# Patient Record
Sex: FEMALE | Race: WHITE | NOT HISPANIC OR LATINO | Employment: FULL TIME | URBAN - METROPOLITAN AREA
[De-identification: names, ages, dates, MRNs, and addresses within clinical notes are randomized per-mention and may not be internally consistent; named-entity substitution may affect disease eponyms.]

---

## 2018-05-29 ENCOUNTER — OFFICE VISIT (OUTPATIENT)
Dept: FAMILY MEDICINE CLINIC | Facility: CLINIC | Age: 38
End: 2018-05-29

## 2018-05-29 VITALS
TEMPERATURE: 97.7 F | BODY MASS INDEX: 21.68 KG/M2 | WEIGHT: 127 LBS | DIASTOLIC BLOOD PRESSURE: 54 MMHG | SYSTOLIC BLOOD PRESSURE: 126 MMHG | HEART RATE: 48 BPM | HEIGHT: 64 IN | OXYGEN SATURATION: 99 %

## 2018-05-29 DIAGNOSIS — F33.0 MILD EPISODE OF RECURRENT MAJOR DEPRESSIVE DISORDER (HCC): ICD-10-CM

## 2018-05-29 DIAGNOSIS — F41.9 ANXIETY: Primary | ICD-10-CM

## 2018-05-29 PROCEDURE — 99203 OFFICE O/P NEW LOW 30 MIN: CPT | Performed by: NURSE PRACTITIONER

## 2018-05-29 RX ORDER — CITALOPRAM 20 MG/1
20 TABLET ORAL DAILY
Qty: 90 TABLET | Refills: 3 | Status: SHIPPED | OUTPATIENT
Start: 2018-05-29 | End: 2018-08-06 | Stop reason: DRUGHIGH

## 2018-05-29 RX ORDER — LEVOCETIRIZINE DIHYDROCHLORIDE 5 MG/1
5 TABLET, FILM COATED ORAL EVERY EVENING
COMMUNITY
End: 2020-02-13 | Stop reason: SDUPTHER

## 2018-05-29 NOTE — PROGRESS NOTES
"Subjective   Valeria Castillo is a 38 y.o. female.     History of Present Illness   Valeria Castillo 38 y.o. female who presents today for a new patient appointment.    she has a history of   Patient Active Problem List   Diagnosis   • Anxiety   • Mild episode of recurrent major depressive disorder   .  she is here to establish care I reviewed the PFSH recorded today by my MA/LPN staff.   she   She has been feeling well.    She is presenting to the office today seeking help regarding anxiety and mild depression that she's been experiencing for at least 2 years.  Several years ago she tried Wellbutrin but she didn't like the way that made her feel.  She also has been on Ativan in the past and currently has a small amount that left for her anxiety.  Patient states that she has irritability road rage she also snaps at people in periods of anger.    The following portions of the patient's history were reviewed and updated as appropriate: allergies, current medications, past social history and problem list.    Review of Systems   Constitutional: Negative for fever.   All other systems reviewed and are negative.      Objective   /54 (BP Location: Left arm, Patient Position: Sitting)   Pulse (!) 48   Temp 97.7 °F (36.5 °C)   Ht 162.6 cm (64\")   Wt 57.6 kg (127 lb)   SpO2 99%   BMI 21.80 kg/m²   Physical Exam   Constitutional: She is oriented to person, place, and time. Vital signs are normal. She appears well-developed and well-nourished. No distress.   HENT:   Head: Normocephalic.   Cardiovascular: Normal rate, regular rhythm and normal heart sounds.    Pulmonary/Chest: Effort normal and breath sounds normal.   Neurological: She is alert and oriented to person, place, and time. Gait normal.   Psychiatric: She has a normal mood and affect. Her behavior is normal. Judgment and thought content normal.   Vitals reviewed.      Assessment/Plan   Problem List Items Addressed This Visit        Other    Anxiety - Primary    " Relevant Medications    citalopram (CELEXA) 20 MG tablet    Mild episode of recurrent major depressive disorder    Relevant Medications    citalopram (CELEXA) 20 MG tablet        rto in 6 weeks

## 2018-06-14 ENCOUNTER — TELEPHONE (OUTPATIENT)
Dept: FAMILY MEDICINE CLINIC | Facility: CLINIC | Age: 38
End: 2018-06-14

## 2018-06-14 NOTE — TELEPHONE ENCOUNTER
Pt states she has been taking the celexa for a couple weeks and she can notice a difference but she still feels like she's on edge and the irritability is still there. Pt likes the medication and does not want to change but is curious if she might need an increase in dosage. Please advise.

## 2018-07-10 ENCOUNTER — OFFICE VISIT (OUTPATIENT)
Dept: FAMILY MEDICINE CLINIC | Facility: CLINIC | Age: 38
End: 2018-07-10

## 2018-07-10 ENCOUNTER — TELEPHONE (OUTPATIENT)
Dept: FAMILY MEDICINE CLINIC | Facility: CLINIC | Age: 38
End: 2018-07-10

## 2018-07-10 VITALS
TEMPERATURE: 98.3 F | HEART RATE: 63 BPM | BODY MASS INDEX: 21.41 KG/M2 | SYSTOLIC BLOOD PRESSURE: 104 MMHG | DIASTOLIC BLOOD PRESSURE: 68 MMHG | OXYGEN SATURATION: 99 % | HEIGHT: 64 IN | WEIGHT: 125.4 LBS

## 2018-07-10 DIAGNOSIS — F33.0 MILD EPISODE OF RECURRENT MAJOR DEPRESSIVE DISORDER (HCC): ICD-10-CM

## 2018-07-10 DIAGNOSIS — F41.9 ANXIETY: Primary | ICD-10-CM

## 2018-07-10 PROCEDURE — 99213 OFFICE O/P EST LOW 20 MIN: CPT | Performed by: NURSE PRACTITIONER

## 2018-07-10 NOTE — TELEPHONE ENCOUNTER
Pt forgot to request a sleep aide in her visit today. Pt use to see Dr. Derrick Putnam at [facility]. Dr. Putnam prescribed her mediation for insomnia. Pt states that you can contact his office at [phone] if you have any questions. Is this something Leonides will prescribe for pt?

## 2018-07-10 NOTE — PROGRESS NOTES
"Subjective   Valeria Castillo is a 38 y.o. female.     History of Present Illness   Patient presenting to the office today to follow-up on adding Celexa 20 mg daily.  Patient taking it daily as directed without any side effects she states that she is seeing some improvement but she's not completely where she would like to be.  She still having moments of irritability.    The following portions of the patient's history were reviewed and updated as appropriate: allergies, current medications, past social history and problem list.    Review of Systems   Constitutional: Negative for fever.   All other systems reviewed and are negative.      Objective   /68 (BP Location: Right arm, Patient Position: Sitting)   Pulse 63   Temp 98.3 °F (36.8 °C)   Ht 162.6 cm (64.02\")   Wt 56.9 kg (125 lb 6.4 oz)   SpO2 99%   BMI 21.51 kg/m²   Physical Exam   Constitutional: She is oriented to person, place, and time. Vital signs are normal. She appears well-developed and well-nourished. No distress.   HENT:   Head: Normocephalic.   Cardiovascular: Normal rate, regular rhythm and normal heart sounds.    Pulmonary/Chest: Effort normal and breath sounds normal.   Neurological: She is alert and oriented to person, place, and time. Gait normal.   Psychiatric: She has a normal mood and affect. Her behavior is normal. Judgment and thought content normal.   Vitals reviewed.      Assessment/Plan   Problem List Items Addressed This Visit        Other    Anxiety - Primary    Mild episode of recurrent major depressive disorder (CMS/HCC)        Increase celexa to 30mg and call in 4 weeks if not better        "

## 2018-07-11 RX ORDER — DOXEPIN HYDROCHLORIDE 10 MG/1
10 CAPSULE ORAL NIGHTLY
Qty: 30 CAPSULE | Refills: 0 | Status: SHIPPED | OUTPATIENT
Start: 2018-07-11 | End: 2018-11-20

## 2018-07-11 NOTE — TELEPHONE ENCOUNTER
temazepam is the one she has used with success. She also having night sweats that she mentioned in her appointment.

## 2018-07-11 NOTE — TELEPHONE ENCOUNTER
Left message letting patient know what gracia said and for her to call the office back if she is ok with gracia prescribing something else

## 2018-07-20 NOTE — TELEPHONE ENCOUNTER
Patient left message asking if gracia would call her she would like to speak with her directly. It is regarding her sleep medication

## 2018-07-30 RX ORDER — ZOLPIDEM TARTRATE 10 MG/1
10 TABLET ORAL NIGHTLY PRN
Qty: 90 TABLET | Refills: 0 | OUTPATIENT
Start: 2018-07-30 | End: 2018-11-06 | Stop reason: SDUPTHER

## 2018-08-06 ENCOUNTER — TELEPHONE (OUTPATIENT)
Dept: FAMILY MEDICINE CLINIC | Facility: CLINIC | Age: 38
End: 2018-08-06

## 2018-08-06 RX ORDER — CITALOPRAM 40 MG/1
40 TABLET ORAL DAILY
Qty: 30 TABLET | Refills: 3 | Status: SHIPPED | OUTPATIENT
Start: 2018-08-06 | End: 2019-01-20 | Stop reason: SDUPTHER

## 2018-08-06 NOTE — TELEPHONE ENCOUNTER
Called patient and she said gracia told her to call if the dose needed to be increased again. She was seen on 7/10 and gracia put at the end of note that patient was being increased to 30mg and to call in 4 weeks if not better

## 2018-11-06 RX ORDER — ZOLPIDEM TARTRATE 10 MG/1
10 TABLET ORAL NIGHTLY PRN
Qty: 90 TABLET | Refills: 0 | Status: SHIPPED | OUTPATIENT
Start: 2018-11-06 | End: 2018-11-20 | Stop reason: ALTCHOICE

## 2018-11-06 NOTE — TELEPHONE ENCOUNTER
Pt made an Ambien appointment on 11/20. That is pt's next day off from work. Pt states that she did not know she had to be seen every 3 mths for the Ambien? Pt requesting a refill until she can come in for appointment?     Pt does have appointment with [facility] in [place] but pt states they keep rescheduling her appt.

## 2018-11-20 ENCOUNTER — OFFICE VISIT (OUTPATIENT)
Dept: FAMILY MEDICINE CLINIC | Facility: CLINIC | Age: 38
End: 2018-11-20

## 2018-11-20 VITALS
BODY MASS INDEX: 27.76 KG/M2 | WEIGHT: 162.6 LBS | HEART RATE: 82 BPM | TEMPERATURE: 98 F | SYSTOLIC BLOOD PRESSURE: 128 MMHG | HEIGHT: 64 IN | DIASTOLIC BLOOD PRESSURE: 74 MMHG | OXYGEN SATURATION: 98 % | RESPIRATION RATE: 14 BRPM

## 2018-11-20 DIAGNOSIS — F51.04 CHRONIC INSOMNIA: ICD-10-CM

## 2018-11-20 DIAGNOSIS — F33.0 MILD EPISODE OF RECURRENT MAJOR DEPRESSIVE DISORDER (HCC): Primary | ICD-10-CM

## 2018-11-20 PROCEDURE — 99214 OFFICE O/P EST MOD 30 MIN: CPT | Performed by: NURSE PRACTITIONER

## 2018-11-20 RX ORDER — BUPROPION HYDROCHLORIDE 150 MG/1
150 TABLET ORAL DAILY
Qty: 90 TABLET | Refills: 3 | Status: SHIPPED | OUTPATIENT
Start: 2018-11-20 | End: 2019-02-11 | Stop reason: DRUGHIGH

## 2018-11-20 RX ORDER — ZOLPIDEM TARTRATE 12.5 MG/1
12.5 TABLET, FILM COATED, EXTENDED RELEASE ORAL NIGHTLY PRN
Qty: 30 TABLET | Refills: 0 | Status: SHIPPED | OUTPATIENT
Start: 2018-11-20 | End: 2018-12-26 | Stop reason: SDUPTHER

## 2018-11-20 NOTE — PROGRESS NOTES
"Subjective   Valeria Castillo is a 38 y.o. female.     History of Present Illness   Ambien is not working to keep her asleep all night it is helping her to fall asleep but not stay asleep. She is requesting something different    Depression is not controlled.  She is having trouble with not wanting to get out of bed.    The following portions of the patient's history were reviewed and updated as appropriate: allergies, current medications, past social history and problem list.    Review of Systems   All other systems reviewed and are negative.      Objective   /74   Pulse 82   Temp 98 °F (36.7 °C) (Oral)   Resp 14   Ht 162.6 cm (64\")   Wt 73.8 kg (162 lb 9.6 oz)   SpO2 98%   BMI 27.91 kg/m²   Physical Exam   Constitutional: She is oriented to person, place, and time. Vital signs are normal. She appears well-developed and well-nourished. No distress.   HENT:   Head: Normocephalic.   Cardiovascular: Normal rate, regular rhythm and normal heart sounds.   Pulmonary/Chest: Effort normal and breath sounds normal.   Neurological: She is alert and oriented to person, place, and time. Gait normal.   Psychiatric: She has a normal mood and affect. Her behavior is normal. Judgment and thought content normal.   Vitals reviewed.      Assessment/Plan      Diagnosis Plan   1. Mild episode of recurrent major depressive disorder (CMS/HCC)  buPROPion XL (WELLBUTRIN XL) 150 MG 24 hr tablet   2. Chronic insomnia  zolpidem CR (AMBIEN CR) 12.5 MG CR tablet     rto in 3 chau Rey, APRN  11/20/2018    "

## 2018-12-26 ENCOUNTER — TELEPHONE (OUTPATIENT)
Dept: FAMILY MEDICINE CLINIC | Facility: CLINIC | Age: 38
End: 2018-12-26

## 2018-12-26 DIAGNOSIS — F51.04 CHRONIC INSOMNIA: ICD-10-CM

## 2019-01-04 RX ORDER — ZOLPIDEM TARTRATE 12.5 MG/1
12.5 TABLET, FILM COATED, EXTENDED RELEASE ORAL NIGHTLY PRN
Qty: 30 TABLET | Refills: 0 | Status: SHIPPED | OUTPATIENT
Start: 2019-01-04 | End: 2019-01-28 | Stop reason: SDUPTHER

## 2019-01-21 RX ORDER — CITALOPRAM 40 MG/1
40 TABLET ORAL DAILY
Qty: 30 TABLET | Refills: 0 | Status: SHIPPED | OUTPATIENT
Start: 2019-01-21 | End: 2019-02-20 | Stop reason: SDUPTHER

## 2019-01-28 DIAGNOSIS — F51.04 CHRONIC INSOMNIA: ICD-10-CM

## 2019-01-29 RX ORDER — ZOLPIDEM TARTRATE 12.5 MG/1
12.5 TABLET, FILM COATED, EXTENDED RELEASE ORAL NIGHTLY PRN
Qty: 30 TABLET | Refills: 0 | Status: SHIPPED | OUTPATIENT
Start: 2019-01-29 | End: 2019-05-08

## 2019-02-11 ENCOUNTER — OFFICE VISIT (OUTPATIENT)
Dept: FAMILY MEDICINE CLINIC | Facility: CLINIC | Age: 39
End: 2019-02-11

## 2019-02-11 VITALS
WEIGHT: 162 LBS | HEIGHT: 64 IN | DIASTOLIC BLOOD PRESSURE: 68 MMHG | TEMPERATURE: 98.7 F | HEART RATE: 72 BPM | BODY MASS INDEX: 27.66 KG/M2 | OXYGEN SATURATION: 98 % | SYSTOLIC BLOOD PRESSURE: 106 MMHG

## 2019-02-11 DIAGNOSIS — F41.9 ANXIETY: Primary | ICD-10-CM

## 2019-02-11 DIAGNOSIS — F51.04 CHRONIC INSOMNIA: ICD-10-CM

## 2019-02-11 PROCEDURE — 99214 OFFICE O/P EST MOD 30 MIN: CPT | Performed by: NURSE PRACTITIONER

## 2019-02-11 RX ORDER — BUPROPION HYDROCHLORIDE 300 MG/1
300 TABLET ORAL DAILY
Qty: 90 TABLET | Refills: 3 | Status: SHIPPED | OUTPATIENT
Start: 2019-02-11 | End: 2019-08-08

## 2019-02-11 NOTE — PROGRESS NOTES
"Subjective   Valeria Castillo is a 38 y.o. female.     History of Present Illness   Valeria Castillo 38 y.o. female presents for follow up of insomnia with onset of symptoms years ago. Patient describes symptoms as frequent night time awakening and difficulty falling asleep. Patient has found complete relief with Ambien (Zolpidem). Associated symptoms include: fatigue if unable to take Rx . Patient denies daytime somnolence Symptoms have stabilized.  The patient has failed multiple OTC medications for insomnia.  They are well controlled on current Rx and will continue to try to take Rx PRN.  She will use the lowest effective dose.  The patient has read and signed the [facility] Controlled Substance Contract.  I will continue to see patient for regular follow up appointments and be prescribed the lowest effective dose.  ELIZA has been reviewed by me and is updated every 3 months. The patient is aware of the potential for addiction and dependence.  She denies that Ambien (Zolpidem) causes excessive daytime drowsiness and sleep walking.  Patient voices understanding to take Ambien (Zolpidem) and go straight to bed. Patient must be able to sleep 7 hours or more when taking this.  Patient will hold Rx and contact me if they experience any impaired mental alertness the next day.      Also likes the Wellbutrin and it is helping but wondering if the dose can be increased.  She likes it more than the Celexa and is having sexual side effects from the Celexa.      The following portions of the patient's history were reviewed and updated as appropriate: allergies, current medications, past social history and problem list.    Review of Systems   All other systems reviewed and are negative.      Objective   /68 (BP Location: Right arm, Patient Position: Sitting)   Pulse 72   Temp 98.7 °F (37.1 °C)   Ht 162 cm (63.78\")   Wt 73.5 kg (162 lb)   SpO2 98%   BMI 28.00 kg/m²   Physical Exam   Constitutional: She is oriented " to person, place, and time. Vital signs are normal. She appears well-developed and well-nourished. No distress.   HENT:   Head: Normocephalic.   Cardiovascular: Normal rate, regular rhythm and normal heart sounds.   Pulmonary/Chest: Effort normal and breath sounds normal.   Neurological: She is alert and oriented to person, place, and time. Gait normal.   Psychiatric: She has a normal mood and affect. Her behavior is normal. Judgment and thought content normal.   Vitals reviewed.      Assessment/Plan      Diagnosis Plan   1. Anxiety     2. Chronic insomnia       Decrease celexa to 20mg and increase wellbutrin to 30mg  Refill ambien in March    ANISA Thibodeaux  2/11/2019

## 2019-02-20 RX ORDER — CITALOPRAM 40 MG/1
40 TABLET ORAL DAILY
Qty: 30 TABLET | Refills: 11 | Status: SHIPPED | OUTPATIENT
Start: 2019-02-20 | End: 2019-05-08

## 2019-02-22 ENCOUNTER — TELEPHONE (OUTPATIENT)
Dept: FAMILY MEDICINE CLINIC | Facility: CLINIC | Age: 39
End: 2019-02-22

## 2019-04-02 DIAGNOSIS — F51.04 CHRONIC INSOMNIA: ICD-10-CM

## 2019-04-02 RX ORDER — ZOLPIDEM TARTRATE 12.5 MG/1
12.5 TABLET, FILM COATED, EXTENDED RELEASE ORAL NIGHTLY PRN
Qty: 30 TABLET | Refills: 0 | Status: SHIPPED | OUTPATIENT
Start: 2019-04-02 | End: 2019-05-07 | Stop reason: SDUPTHER

## 2019-05-02 DIAGNOSIS — F51.04 CHRONIC INSOMNIA: ICD-10-CM

## 2019-05-03 RX ORDER — ZOLPIDEM TARTRATE 12.5 MG/1
12.5 TABLET, FILM COATED, EXTENDED RELEASE ORAL NIGHTLY PRN
Qty: 30 TABLET | Refills: 0 | OUTPATIENT
Start: 2019-05-03

## 2019-05-07 DIAGNOSIS — F51.04 CHRONIC INSOMNIA: ICD-10-CM

## 2019-05-07 RX ORDER — ZOLPIDEM TARTRATE 12.5 MG/1
12.5 TABLET, FILM COATED, EXTENDED RELEASE ORAL NIGHTLY PRN
Qty: 30 TABLET | Refills: 0 | Status: SHIPPED | OUTPATIENT
Start: 2019-05-07 | End: 2019-06-05 | Stop reason: SDUPTHER

## 2019-05-07 NOTE — TELEPHONE ENCOUNTER
Patient has an appointment tomorrow she is wanting to know if gracia will go ahead and fill her ambien?

## 2019-05-08 ENCOUNTER — OFFICE VISIT (OUTPATIENT)
Dept: FAMILY MEDICINE CLINIC | Facility: CLINIC | Age: 39
End: 2019-05-08

## 2019-05-08 VITALS
DIASTOLIC BLOOD PRESSURE: 66 MMHG | TEMPERATURE: 97.8 F | HEIGHT: 64 IN | OXYGEN SATURATION: 100 % | SYSTOLIC BLOOD PRESSURE: 104 MMHG | BODY MASS INDEX: 27.42 KG/M2 | HEART RATE: 69 BPM | WEIGHT: 160.63 LBS

## 2019-05-08 DIAGNOSIS — F51.04 CHRONIC INSOMNIA: Primary | ICD-10-CM

## 2019-05-08 PROCEDURE — 99213 OFFICE O/P EST LOW 20 MIN: CPT | Performed by: NURSE PRACTITIONER

## 2019-05-08 NOTE — PROGRESS NOTES
"Subjective   Valeria Castillo is a 39 y.o. female.     History of Present Illness   Valeria Castillo 39 y.o. female presents for follow up of insomnia with onset of symptoms years ago. Patient describes symptoms as frequent night time awakening and difficulty falling asleep. Patient has found complete relief with Ambien (Zolpidem). Associated symptoms include: fatigue if unable to take Rx . Patient denies daytime somnolence Symptoms have stabilized.  The patient has failed multiple OTC medications for insomnia.  They are well controlled on current Rx and will continue to try to take Rx PRN.  She will use the lowest effective dose.  The patient has read and signed the [facility] Controlled Substance Contract.  I will continue to see patient for regular follow up appointments and be prescribed the lowest effective dose.  ELIZA has been reviewed by me and is updated every 3 months. The patient is aware of the potential for addiction and dependence.  She denies that Ambien (Zolpidem) causes excessive daytime drowsiness and sleep walking.  Patient voices understanding to take Ambien (Zolpidem) and go straight to bed. Patient must be able to sleep 7 hours or more when taking this.  Patient will hold Rx and contact me if they experience any impaired mental alertness the next day.        The following portions of the patient's history were reviewed and updated as appropriate: allergies, current medications, past social history and problem list.    Review of Systems   Constitutional: Negative for fever.   Respiratory: Negative for cough and shortness of breath.    Cardiovascular: Negative for chest pain.   Gastrointestinal: Negative for abdominal pain.   Neurological: Negative for dizziness.       Objective   Ht 162 cm (63.78\")   Wt 72.9 kg (160 lb 10.1 oz)   BMI 27.76 kg/m²   Physical Exam   Constitutional: She is oriented to person, place, and time. Vital signs are normal. She appears well-developed and well-nourished. No " distress.   HENT:   Head: Normocephalic.   Cardiovascular: Normal rate, regular rhythm and normal heart sounds.   Pulmonary/Chest: Effort normal and breath sounds normal.   Neurological: She is alert and oriented to person, place, and time. Gait normal.   Psychiatric: She has a normal mood and affect. Her behavior is normal. Judgment and thought content normal.   Vitals reviewed.    The patient has read and signed the [facility] Controlled Substance Contract.  I will continue to see patient for regular follow up appointments.  They are well controlled on their medication.  ELIZA has been reviewed by me and is updated every 3 months. The patient is aware of the potential for addiction and dependence.    Assessment/Plan      Diagnosis Plan   1. Chronic insomnia       rto in 3 chau Rey, ANISA  5/8/2019

## 2019-05-13 ENCOUNTER — TELEPHONE (OUTPATIENT)
Dept: FAMILY MEDICINE CLINIC | Facility: CLINIC | Age: 39
End: 2019-05-13

## 2019-05-13 NOTE — TELEPHONE ENCOUNTER
Patient left message asking if gracia would call her so she can speak to her personally. She is having some issues with the Wellbutrin she was prescribed and and wanted to discuss it with gracia

## 2019-06-05 DIAGNOSIS — F51.04 CHRONIC INSOMNIA: ICD-10-CM

## 2019-06-05 RX ORDER — ZOLPIDEM TARTRATE 12.5 MG/1
12.5 TABLET, FILM COATED, EXTENDED RELEASE ORAL NIGHTLY PRN
Qty: 30 TABLET | Refills: 0 | Status: SHIPPED | OUTPATIENT
Start: 2019-06-05 | End: 2019-07-08 | Stop reason: SDUPTHER

## 2019-07-08 DIAGNOSIS — F51.04 CHRONIC INSOMNIA: ICD-10-CM

## 2019-07-15 RX ORDER — ZOLPIDEM TARTRATE 12.5 MG/1
12.5 TABLET, FILM COATED, EXTENDED RELEASE ORAL NIGHTLY PRN
Qty: 30 TABLET | Refills: 0 | OUTPATIENT
Start: 2019-07-15 | End: 2019-08-08

## 2019-08-08 ENCOUNTER — OFFICE VISIT (OUTPATIENT)
Dept: FAMILY MEDICINE CLINIC | Facility: CLINIC | Age: 39
End: 2019-08-08

## 2019-08-08 ENCOUNTER — PRIOR AUTHORIZATION (OUTPATIENT)
Dept: FAMILY MEDICINE CLINIC | Facility: CLINIC | Age: 39
End: 2019-08-08

## 2019-08-08 VITALS
HEIGHT: 64 IN | SYSTOLIC BLOOD PRESSURE: 110 MMHG | HEART RATE: 70 BPM | TEMPERATURE: 98.3 F | WEIGHT: 164.8 LBS | DIASTOLIC BLOOD PRESSURE: 70 MMHG | BODY MASS INDEX: 28.13 KG/M2 | OXYGEN SATURATION: 99 %

## 2019-08-08 DIAGNOSIS — F33.0 MILD EPISODE OF RECURRENT MAJOR DEPRESSIVE DISORDER (HCC): ICD-10-CM

## 2019-08-08 DIAGNOSIS — F51.04 CHRONIC INSOMNIA: Primary | ICD-10-CM

## 2019-08-08 PROCEDURE — 99214 OFFICE O/P EST MOD 30 MIN: CPT | Performed by: NURSE PRACTITIONER

## 2019-08-08 RX ORDER — ESZOPICLONE 3 MG/1
3 TABLET, FILM COATED ORAL NIGHTLY
Qty: 30 TABLET | Refills: 0 | Status: SHIPPED | OUTPATIENT
Start: 2019-08-08 | End: 2019-09-18 | Stop reason: SDUPTHER

## 2019-08-08 RX ORDER — VILAZODONE HYDROCHLORIDE 20 MG/1
20 TABLET ORAL DAILY
Qty: 30 TABLET | Refills: 2 | Status: SHIPPED | OUTPATIENT
Start: 2019-08-08 | End: 2019-10-16 | Stop reason: SDUPTHER

## 2019-08-08 NOTE — PROGRESS NOTES
Subjective   Valeria Carrasquillo is a 39 y.o. female.     History of Present Illness   Valeria Carrasquillo 39 y.o. female presents for follow up of insomnia with onset of symptoms years ago. Patient describes symptoms as frequent night time awakening and difficulty falling asleep. Patient has found minimal relief with Ambien (Zolpidem). Associated symptoms include: fatigue if unable to take Rx . Patient denies daytime somnolence Symptoms have stabilized.  The patient has failed multiple OTC medications for insomnia.  They are well controlled on current Rx and will continue to try to take Rx PRN.  She will use the lowest effective dose.  The patient has read and signed the [facility] Controlled Substance Contract.  I will continue to see patient for regular follow up appointments and be prescribed the lowest effective dose.  ELIZA has been reviewed by me and is updated every 3 months. The patient is aware of the potential for addiction and dependence.  She denies that Ambien (Zolpidem) causes excessive daytime drowsiness and sleep walking.  Patient voices understanding to take Ambien (Zolpidem) and go straight to bed. Patient must be able to sleep 7 hours or more when taking this.  Patient will hold Rx and contact me if they experience any impaired mental alertness the next day.      Patient is also weaned herself off of the Wellbutrin because she did not like the side effects that was giving her but she is needing something to help her with her irritability.  She is constantly feeling angry she is getting very irritable and has a short fuse and will get mad and yell at her  without really much of a cause.  We have also tried Celexa in the past but it caused sexual side effects.    The following portions of the patient's history were reviewed and updated as appropriate: allergies, current medications, past social history and problem list.    Review of Systems   Constitutional: Negative for fever.   Respiratory:  "Negative for cough and shortness of breath.    Cardiovascular: Negative for chest pain.   Gastrointestinal: Negative for abdominal pain.   Neurological: Negative for dizziness.       Objective   /70 (BP Location: Left arm, Patient Position: Sitting)   Pulse 70   Temp 98.3 °F (36.8 °C)   Ht 162 cm (63.78\")   Wt 74.8 kg (164 lb 12.8 oz)   SpO2 99%   BMI 28.48 kg/m²   Physical Exam   Constitutional: She is oriented to person, place, and time. Vital signs are normal. She appears well-developed and well-nourished. No distress.   HENT:   Head: Normocephalic.   Cardiovascular: Normal rate, regular rhythm and normal heart sounds.   Pulmonary/Chest: Effort normal and breath sounds normal.   Neurological: She is alert and oriented to person, place, and time. Gait normal.   Psychiatric: She has a normal mood and affect. Her behavior is normal. Judgment and thought content normal.   Vitals reviewed.      Assessment/Plan      Diagnosis Plan   1. Chronic insomnia  eszopiclone (LUNESTA) 3 MG tablet   2. Mild episode of recurrent major depressive disorder (CMS/HCC)  vilazodone (VIIBRYD) 20 MG tablet tablet     Start Lunesta call with results of that start Viibryd return to the office in 3 months  ANISA Thibodeaux  8/8/2019  "

## 2019-09-18 DIAGNOSIS — F51.04 CHRONIC INSOMNIA: ICD-10-CM

## 2019-09-18 RX ORDER — ESZOPICLONE 3 MG/1
3 TABLET, FILM COATED ORAL NIGHTLY
Qty: 30 TABLET | Refills: 0 | Status: SHIPPED | OUTPATIENT
Start: 2019-09-18 | End: 2019-10-16 | Stop reason: SDUPTHER

## 2019-10-16 DIAGNOSIS — F33.0 MILD EPISODE OF RECURRENT MAJOR DEPRESSIVE DISORDER (HCC): ICD-10-CM

## 2019-10-16 DIAGNOSIS — F51.04 CHRONIC INSOMNIA: ICD-10-CM

## 2019-10-16 RX ORDER — VILAZODONE HYDROCHLORIDE 20 MG/1
TABLET ORAL
Qty: 30 TABLET | Refills: 0 | Status: SHIPPED | OUTPATIENT
Start: 2019-10-16 | End: 2019-11-21 | Stop reason: SDUPTHER

## 2019-10-16 RX ORDER — ESZOPICLONE 3 MG/1
TABLET, FILM COATED ORAL
Qty: 30 TABLET | Refills: 0 | Status: SHIPPED | OUTPATIENT
Start: 2019-10-16 | End: 2019-11-21 | Stop reason: SDUPTHER

## 2019-11-21 DIAGNOSIS — F33.0 MILD EPISODE OF RECURRENT MAJOR DEPRESSIVE DISORDER (HCC): ICD-10-CM

## 2019-11-21 DIAGNOSIS — F51.04 CHRONIC INSOMNIA: ICD-10-CM

## 2019-11-21 RX ORDER — ESZOPICLONE 3 MG/1
TABLET, FILM COATED ORAL
Qty: 30 TABLET | Refills: 0 | Status: SHIPPED | OUTPATIENT
Start: 2019-11-21 | End: 2019-12-23

## 2019-11-21 RX ORDER — VILAZODONE HYDROCHLORIDE 20 MG/1
20 TABLET ORAL DAILY
Qty: 90 TABLET | Refills: 0 | Status: SHIPPED | OUTPATIENT
Start: 2019-11-21 | End: 2019-11-21 | Stop reason: SDUPTHER

## 2019-11-21 RX ORDER — VILAZODONE HYDROCHLORIDE 20 MG/1
20 TABLET ORAL DAILY
Qty: 90 TABLET | Refills: 0 | Status: SHIPPED | OUTPATIENT
Start: 2019-11-21 | End: 2020-02-13 | Stop reason: SDUPTHER

## 2019-11-22 ENCOUNTER — OFFICE VISIT (OUTPATIENT)
Dept: FAMILY MEDICINE CLINIC | Facility: CLINIC | Age: 39
End: 2019-11-22

## 2019-11-22 VITALS
BODY MASS INDEX: 25.03 KG/M2 | OXYGEN SATURATION: 100 % | WEIGHT: 146.6 LBS | TEMPERATURE: 98.1 F | SYSTOLIC BLOOD PRESSURE: 124 MMHG | HEART RATE: 72 BPM | DIASTOLIC BLOOD PRESSURE: 68 MMHG | HEIGHT: 64 IN

## 2019-11-22 DIAGNOSIS — F51.04 CHRONIC INSOMNIA: Primary | ICD-10-CM

## 2019-11-22 DIAGNOSIS — F41.9 ANXIETY: ICD-10-CM

## 2019-11-22 PROCEDURE — 99214 OFFICE O/P EST MOD 30 MIN: CPT | Performed by: NURSE PRACTITIONER

## 2019-11-22 RX ORDER — HYDROXYZINE HYDROCHLORIDE 25 MG/1
25 TABLET, FILM COATED ORAL 3 TIMES DAILY PRN
Qty: 30 TABLET | Refills: 0 | Status: SHIPPED | OUTPATIENT
Start: 2019-11-22 | End: 2019-12-23

## 2019-11-22 NOTE — PROGRESS NOTES
Subjective   Valeria Carrasquillo is a 39 y.o. female.     History of Present Illness   Valeria Carrasquillo 39 y.o. female presents for follow up of insomnia with onset of symptoms years ago. Patient describes symptoms as frequent night time awakening and difficulty falling asleep. Patient has found complete relief with Lunesta ([place]). Associated symptoms include: fatigue if unable to take Rx . Patient denies daytime somnolence Symptoms have stabilized.  The patient has failed multiple OTC medications for insomnia.  They are well controlled on current Rx and will continue to try to take Rx PRN.  She will use the lowest effective dose.  The patient has read and signed the [facility] Controlled Substance Contract.  I will continue to see patient for regular follow up appointments and be prescribed the lowest effective dose.  ELIZA has been reviewed by me and is updated every 3 months. The patient is aware of the potential for addiction and dependence.  She denies that Lunesta (Zalepton) causes excessive daytime drowsiness and sleep walking.  Patient voices understanding to take Lunesta (Zalepton) and go straight to bed. Patient must be able to sleep 7 hours or more when taking this.  Patient will hold Rx and contact me if they experience any impaired mental alertness the next day.        Anxiety is high bc lost job recently and having trouble shutting off brain at night and unable to sleep well.  She is taking her Viibryd as directed and it is working just situational anxiety right now     The following portions of the patient's history were reviewed and updated as appropriate: allergies, current medications, past social history and problem list.    Review of Systems   Constitutional: Negative for fever.   Respiratory: Negative for cough and shortness of breath.    Cardiovascular: Negative for chest pain.   Gastrointestinal: Negative for abdominal pain.   Neurological: Negative for dizziness.       Objective   /68  "(BP Location: Right arm, Patient Position: Sitting)   Pulse 72   Temp 98.1 °F (36.7 °C)   Ht 162 cm (63.78\")   Wt 66.5 kg (146 lb 9.6 oz)   SpO2 100%   BMI 25.34 kg/m²   Physical Exam   Constitutional: She is oriented to person, place, and time. Vital signs are normal. She appears well-developed and well-nourished. No distress.   HENT:   Head: Normocephalic.   Cardiovascular: Normal rate, regular rhythm and normal heart sounds.   Pulmonary/Chest: Effort normal and breath sounds normal.   Neurological: She is alert and oriented to person, place, and time. Gait normal.   Psychiatric: She has a normal mood and affect. Her behavior is normal. Judgment and thought content normal.   Vitals reviewed.    The patient has read and signed the [facility] Controlled Substance Contract.  I will continue to see patient for regular follow up appointments.  They are well controlled on their medication.  ELIZA has been reviewed by me and is updated every 3 months. The patient is aware of the potential for addiction and dependence.    Assessment/Plan      Diagnosis Plan   1. Chronic insomnia     2. Anxiety  hydrOXYzine (ATARAX) 25 MG tablet     rto in 3 mons     Leonides Rey, APRN  11/22/2019  "

## 2019-12-22 DIAGNOSIS — F41.9 ANXIETY: ICD-10-CM

## 2019-12-22 DIAGNOSIS — F51.04 CHRONIC INSOMNIA: ICD-10-CM

## 2019-12-23 ENCOUNTER — TELEPHONE (OUTPATIENT)
Dept: FAMILY MEDICINE CLINIC | Facility: CLINIC | Age: 39
End: 2019-12-23

## 2019-12-23 RX ORDER — ESZOPICLONE 3 MG/1
TABLET, FILM COATED ORAL
Qty: 30 TABLET | Refills: 0 | Status: SHIPPED | OUTPATIENT
Start: 2019-12-23 | End: 2020-01-20

## 2019-12-23 RX ORDER — HYDROXYZINE HYDROCHLORIDE 25 MG/1
TABLET, FILM COATED ORAL
Qty: 30 TABLET | Refills: 0 | Status: SHIPPED | OUTPATIENT
Start: 2019-12-23 | End: 2020-01-20

## 2020-01-20 DIAGNOSIS — F51.04 CHRONIC INSOMNIA: ICD-10-CM

## 2020-01-20 DIAGNOSIS — F41.9 ANXIETY: ICD-10-CM

## 2020-01-21 RX ORDER — ESZOPICLONE 3 MG/1
TABLET, FILM COATED ORAL
Qty: 30 TABLET | Refills: 3 | Status: SHIPPED | OUTPATIENT
Start: 2020-01-21 | End: 2020-02-13 | Stop reason: SDUPTHER

## 2020-01-21 RX ORDER — HYDROXYZINE HYDROCHLORIDE 25 MG/1
TABLET, FILM COATED ORAL
Qty: 30 TABLET | Refills: 11 | Status: SHIPPED | OUTPATIENT
Start: 2020-01-21 | End: 2020-02-13 | Stop reason: SDUPTHER

## 2020-02-13 ENCOUNTER — TELEPHONE (OUTPATIENT)
Dept: FAMILY MEDICINE CLINIC | Facility: CLINIC | Age: 40
End: 2020-02-13

## 2020-02-13 ENCOUNTER — OFFICE VISIT (OUTPATIENT)
Dept: FAMILY MEDICINE CLINIC | Facility: CLINIC | Age: 40
End: 2020-02-13

## 2020-02-13 VITALS
TEMPERATURE: 98.1 F | BODY MASS INDEX: 30.34 KG/M2 | DIASTOLIC BLOOD PRESSURE: 74 MMHG | OXYGEN SATURATION: 98 % | SYSTOLIC BLOOD PRESSURE: 118 MMHG | WEIGHT: 131.1 LBS | HEIGHT: 55 IN | HEART RATE: 53 BPM

## 2020-02-13 DIAGNOSIS — R61 NIGHT SWEAT: ICD-10-CM

## 2020-02-13 DIAGNOSIS — G47.19 EXCESSIVE DAYTIME SLEEPINESS: ICD-10-CM

## 2020-02-13 DIAGNOSIS — F33.0 MILD EPISODE OF RECURRENT MAJOR DEPRESSIVE DISORDER (HCC): ICD-10-CM

## 2020-02-13 DIAGNOSIS — F41.9 ANXIETY: ICD-10-CM

## 2020-02-13 DIAGNOSIS — Z00.00 PREVENTATIVE HEALTH CARE: Primary | ICD-10-CM

## 2020-02-13 DIAGNOSIS — T78.40XS ALLERGIC STATE, SEQUELA: ICD-10-CM

## 2020-02-13 DIAGNOSIS — F51.04 CHRONIC INSOMNIA: ICD-10-CM

## 2020-02-13 PROCEDURE — 99203 OFFICE O/P NEW LOW 30 MIN: CPT | Performed by: FAMILY MEDICINE

## 2020-02-13 RX ORDER — ESZOPICLONE 3 MG/1
3 TABLET, FILM COATED ORAL
Qty: 30 TABLET | Refills: 0 | Status: SHIPPED | OUTPATIENT
Start: 2020-02-21 | End: 2020-03-10

## 2020-02-13 RX ORDER — VILAZODONE HYDROCHLORIDE 20 MG/1
20 TABLET ORAL DAILY
Qty: 90 TABLET | Refills: 3 | Status: SHIPPED | OUTPATIENT
Start: 2020-02-13 | End: 2020-06-04 | Stop reason: SDUPTHER

## 2020-02-13 RX ORDER — HYDROXYZINE HYDROCHLORIDE 25 MG/1
TABLET, FILM COATED ORAL
Qty: 30 TABLET | Refills: 5 | Status: SHIPPED | OUTPATIENT
Start: 2020-02-13 | End: 2020-06-04 | Stop reason: SDUPTHER

## 2020-02-13 RX ORDER — LEVOCETIRIZINE DIHYDROCHLORIDE 5 MG/1
5 TABLET, FILM COATED ORAL EVERY EVENING
Qty: 30 TABLET | Refills: 5 | Status: SHIPPED | OUTPATIENT
Start: 2020-02-13 | End: 2020-06-04 | Stop reason: SDUPTHER

## 2020-02-13 NOTE — TELEPHONE ENCOUNTER
Received a call from pharmacy to clarify directions. You sig:for anxiety. The previous sig stated for her to take 1 tab by mouth 3 times daily as need for anxiety. If this is what you would like to keep it as, do you still want QTY:30 as this would only be a 10 day supply.     Please advise.

## 2020-02-13 NOTE — PROGRESS NOTES
Chief Complaint   Patient presents with   • Night Sweats     np       Subjective   Valeria Carrasquillo is a 39 y.o. female.     History of Present Illness     39-year-old female here to establish care as a new patient    Long history of generalized anxiety.  She is currently on Viibryd 20 mg and has been for a year which she tolerates well.  She previously tried Wellbutrin which she had a poor reaction to.  Was also on Celexa in the past.  Had a death in the family about a month ago that was unexpected her nephew passed away at a young age of 35.  She is experiencing increased irritability and trouble dealing with this.  No SI/HI/thoughts thoughts of self-harm.  She is  and has a daughter.  She works full-time day shift.  And she also gets up early to go exercise every day.  She has been able to keep up with her normal routine.  She has been to counseling at [facility] before but is not going right now.    Insomnia: She was on generic Ambien is now is on generic Lunesta and has been for about 2 to 3 months.  Says she has a longstanding history of insomnia.  Trouble staying and getting to sleep.  She says that with the Lunesta she will still wake up in the middle the night.  Discussed with patient risks of Lunesta especially if taken longer than 6 months.  She is aware of sleep hygiene techniques.  Not think she snores.    Also cites waking up sweaty a few episodes. Getting slightly irregular periods.     Health Maintenance: GYN-Jan 23 2020- pap smear abnormal- LSIL needs repeat. Mammogram: getting today. Aunt with breast cancer no other FH of breast or colon cancer. Mother with thyroid issues.  Non-smoker, active, normotensive, BMI appropriate      The following portions of the patient's history were reviewed and updated as appropriate: allergies, current medications, past family history, past medical history, past social history, past surgical history and problem list.      Past Medical History:   Diagnosis  Date   • Anxiety    • Depression        No past surgical history on file.    No family history on file.    Social History     Socioeconomic History   • Marital status: Single     Spouse name: Not on file   • Number of children: Not on file   • Years of education: Not on file   • Highest education level: Not on file   Tobacco Use   • Smoking status: Former Smoker   • Smokeless tobacco: Former User   Substance and Sexual Activity   • Alcohol use: Yes     Comment: rarely       Current Outpatient Medications on File Prior to Visit   Medication Sig Dispense Refill   • eszopiclone (LUNESTA) 3 MG tablet TAKE 1 TABLET BY MOUTH EVERY NIGHT AT BEDTIME 30 tablet 3   • hydrOXYzine (ATARAX) 25 MG tablet TAKE 1 TABLET BY MOUTH THREE TIMES DAILY AS NEEDED FOR ANXIETY 30 tablet 11   • levocetirizine (XYZAL) 5 MG tablet Take 5 mg by mouth Every Evening.     • vilazodone (VIIBRYD) 20 MG tablet tablet Take 1 tablet by mouth Daily. 90 tablet 0     No current facility-administered medications on file prior to visit.        Review of Systems   Constitutional: Negative for activity change, chills and fever.   HENT: Negative for congestion and postnasal drip.    Eyes: Negative for blurred vision and pain.   Respiratory: Negative for cough and shortness of breath.    Cardiovascular: Negative for chest pain.   Gastrointestinal: Negative for abdominal pain, constipation, diarrhea and nausea.   Endocrine: Negative for cold intolerance and heat intolerance.   Genitourinary: Negative for decreased urine volume, dysuria and frequency.   Musculoskeletal: Negative for arthralgias and back pain.   Skin: Negative for rash and skin lesions.   Neurological: Negative for dizziness and confusion.   Psychiatric/Behavioral: Negative for agitation, behavioral problems and depressed mood.           Vitals:    02/13/20 0751   BP: 118/74   Pulse: 53   Temp: 98.1 °F (36.7 °C)   SpO2: 98%      Objective   Physical Exam   Constitutional: She is oriented to  person, place, and time. She appears well-developed and well-nourished.   HENT:   Head: Normocephalic and atraumatic.   Eyes: Pupils are equal, round, and reactive to light. Conjunctivae and EOM are normal.   Neck: Neck supple.   Cardiovascular: Normal rate, regular rhythm and normal heart sounds.   No murmur heard.  Pulmonary/Chest: Effort normal and breath sounds normal. No respiratory distress.   Abdominal: Soft. Bowel sounds are normal.   Musculoskeletal: Normal range of motion.   Neurological: She is alert and oriented to person, place, and time.   Skin: Skin is warm and dry.   Psychiatric: She has a normal mood and affect.   Nursing note and vitals reviewed.        Assessment/Plan   Problem List Items Addressed This Visit        Other    Mild episode of recurrent major depressive disorder (CMS/HCC)    Chronic insomnia    Relevant Orders    Ambulatory Referral to Sleep Medicine      Other Visit Diagnoses     Preventative health care    -  Primary    Relevant Orders    TSH    CBC & Differential    Comprehensive Metabolic Panel    Lipid Panel    Night sweat        Relevant Orders    TSH    CBC & Differential    Comprehensive Metabolic Panel    Lipid Panel    Excessive daytime sleepiness        Relevant Orders    Ambulatory Referral to Sleep Medicine        -Refer to sleep given long standing hx of issues and various medication trials  -Refill Vibryd for depression/anxiety  -Labs and f/u for annual     ELIZA, UDS are utd, discussed against long term use yanira Shore MD

## 2020-02-13 NOTE — TELEPHONE ENCOUNTER
I refilled what was listed in her chart. It should be for once a day if you can adjust the Rx and resend it

## 2020-02-14 ENCOUNTER — TELEPHONE (OUTPATIENT)
Dept: FAMILY MEDICINE CLINIC | Facility: CLINIC | Age: 40
End: 2020-02-14

## 2020-02-14 LAB
ALBUMIN SERPL-MCNC: 5 G/DL (ref 3.5–5.2)
ALBUMIN/GLOB SERPL: 2.1 G/DL
ALP SERPL-CCNC: 28 U/L (ref 39–117)
ALT SERPL-CCNC: 21 U/L (ref 1–33)
AST SERPL-CCNC: 27 U/L (ref 1–32)
BASOPHILS # BLD AUTO: 0.04 10*3/MM3 (ref 0–0.2)
BASOPHILS NFR BLD AUTO: 0.8 % (ref 0–1.5)
BILIRUB SERPL-MCNC: 0.6 MG/DL (ref 0.2–1.2)
BUN SERPL-MCNC: 23 MG/DL (ref 6–20)
BUN/CREAT SERPL: 23.2 (ref 7–25)
CALCIUM SERPL-MCNC: 9.7 MG/DL (ref 8.6–10.5)
CHLORIDE SERPL-SCNC: 97 MMOL/L (ref 98–107)
CHOLEST SERPL-MCNC: 176 MG/DL (ref 0–200)
CO2 SERPL-SCNC: 27.5 MMOL/L (ref 22–29)
CREAT SERPL-MCNC: 0.99 MG/DL (ref 0.57–1)
EOSINOPHIL # BLD AUTO: 0.17 10*3/MM3 (ref 0–0.4)
EOSINOPHIL NFR BLD AUTO: 3.6 % (ref 0.3–6.2)
ERYTHROCYTE [DISTWIDTH] IN BLOOD BY AUTOMATED COUNT: 12.4 % (ref 12.3–15.4)
GLOBULIN SER CALC-MCNC: 2.4 GM/DL
GLUCOSE SERPL-MCNC: 83 MG/DL (ref 65–99)
HCT VFR BLD AUTO: 40.6 % (ref 34–46.6)
HDLC SERPL-MCNC: 74 MG/DL (ref 40–60)
HGB BLD-MCNC: 13.5 G/DL (ref 12–15.9)
IMM GRANULOCYTES # BLD AUTO: 0 10*3/MM3 (ref 0–0.05)
IMM GRANULOCYTES NFR BLD AUTO: 0 % (ref 0–0.5)
LDLC SERPL CALC-MCNC: 90 MG/DL (ref 0–100)
LYMPHOCYTES # BLD AUTO: 2.03 10*3/MM3 (ref 0.7–3.1)
LYMPHOCYTES NFR BLD AUTO: 43.1 % (ref 19.6–45.3)
MCH RBC QN AUTO: 32.9 PG (ref 26.6–33)
MCHC RBC AUTO-ENTMCNC: 33.3 G/DL (ref 31.5–35.7)
MCV RBC AUTO: 99 FL (ref 79–97)
MONOCYTES # BLD AUTO: 0.38 10*3/MM3 (ref 0.1–0.9)
MONOCYTES NFR BLD AUTO: 8.1 % (ref 5–12)
NEUTROPHILS # BLD AUTO: 2.09 10*3/MM3 (ref 1.7–7)
NEUTROPHILS NFR BLD AUTO: 44.4 % (ref 42.7–76)
NRBC BLD AUTO-RTO: 0 /100 WBC (ref 0–0.2)
PLATELET # BLD AUTO: 251 10*3/MM3 (ref 140–450)
POTASSIUM SERPL-SCNC: 4.2 MMOL/L (ref 3.5–5.2)
PROT SERPL-MCNC: 7.4 G/DL (ref 6–8.5)
RBC # BLD AUTO: 4.1 10*6/MM3 (ref 3.77–5.28)
SODIUM SERPL-SCNC: 139 MMOL/L (ref 136–145)
TRIGL SERPL-MCNC: 60 MG/DL (ref 0–150)
TSH SERPL DL<=0.005 MIU/L-ACNC: 2.98 UIU/ML (ref 0.27–4.2)
VLDLC SERPL CALC-MCNC: 12 MG/DL
WBC # BLD AUTO: 4.71 10*3/MM3 (ref 3.4–10.8)

## 2020-02-14 NOTE — TELEPHONE ENCOUNTER
Called pt with lab results.     Per Dr. Shore,  Notify patient her labs look good. Her thyroid, blood counts, metabolic panel and cholesterol are all good. She can schedule f/u with us for physical maybe sometime after she sees Sleep med.     As long as we prescribe Lunesta she will need to be seen q3 mo as well.    Pt has been notified and understands that she will need to come in q3 mo due to the Lunesta. She also wanted to know that since labs were ok where do we go from here regarding the night sweats?

## 2020-02-18 ENCOUNTER — TELEPHONE (OUTPATIENT)
Dept: FAMILY MEDICINE CLINIC | Facility: CLINIC | Age: 40
End: 2020-02-18

## 2020-02-18 NOTE — TELEPHONE ENCOUNTER
Called pt back regarding   Her blood counts are normal as are thyroid levels and blood sugars so I would not be too concerned about night sweats as a dangerous thing which is good. If she has increased her exercise/intensity level trying to adopt a more moderate exercise regimen could be a suggestion. Making sure bedroom is cool and well ventilated. Ask if she is taking aspirin or tylenol close to nighttime/that can at times affect things. Can also discuss further at f/u appointment.      I think its also something she should bring up to Sleep med when she sees them.    Pt voiced understanding and will bring issues up to sleep med and if they are unable to do anything will discuss at f/u appointment with us in May.

## 2020-03-09 DIAGNOSIS — F51.04 CHRONIC INSOMNIA: ICD-10-CM

## 2020-03-10 ENCOUNTER — APPOINTMENT (OUTPATIENT)
Dept: SLEEP MEDICINE | Facility: HOSPITAL | Age: 40
End: 2020-03-10

## 2020-03-10 RX ORDER — ESZOPICLONE 3 MG/1
3 TABLET, FILM COATED ORAL
Qty: 30 TABLET | Refills: 0 | Status: SHIPPED | OUTPATIENT
Start: 2020-03-10 | End: 2020-04-14

## 2020-03-13 ENCOUNTER — APPOINTMENT (OUTPATIENT)
Dept: SLEEP MEDICINE | Facility: HOSPITAL | Age: 40
End: 2020-03-13

## 2020-04-14 ENCOUNTER — APPOINTMENT (OUTPATIENT)
Dept: SLEEP MEDICINE | Facility: HOSPITAL | Age: 40
End: 2020-04-14

## 2020-04-14 DIAGNOSIS — F51.04 CHRONIC INSOMNIA: ICD-10-CM

## 2020-04-14 RX ORDER — ESZOPICLONE 3 MG/1
TABLET, FILM COATED ORAL
Qty: 30 TABLET | Refills: 0 | Status: SHIPPED | OUTPATIENT
Start: 2020-04-14 | End: 2020-05-15

## 2020-05-15 DIAGNOSIS — F51.04 CHRONIC INSOMNIA: ICD-10-CM

## 2020-05-15 RX ORDER — ESZOPICLONE 3 MG/1
TABLET, FILM COATED ORAL
Qty: 30 TABLET | Refills: 0 | Status: SHIPPED | OUTPATIENT
Start: 2020-05-15 | End: 2020-06-04

## 2020-06-02 ENCOUNTER — OFFICE VISIT (OUTPATIENT)
Dept: SLEEP MEDICINE | Facility: HOSPITAL | Age: 40
End: 2020-06-02

## 2020-06-02 VITALS
SYSTOLIC BLOOD PRESSURE: 89 MMHG | HEART RATE: 57 BPM | WEIGHT: 126 LBS | HEIGHT: 64 IN | DIASTOLIC BLOOD PRESSURE: 61 MMHG | BODY MASS INDEX: 21.51 KG/M2

## 2020-06-02 DIAGNOSIS — F41.9 ANXIETY: ICD-10-CM

## 2020-06-02 DIAGNOSIS — F51.04 CHRONIC INSOMNIA: Primary | ICD-10-CM

## 2020-06-02 PROCEDURE — G0463 HOSPITAL OUTPT CLINIC VISIT: HCPCS

## 2020-06-02 NOTE — PROGRESS NOTES
PULMONARY  CONSULT NOTE      PATIENT IDENTIFICATION:  Name: Valeria Carrasquillo  Age: 40 y.o.  Sex: female  :  1980  MRN: [id number]    DATE OF CONSULTATION:  2020   Referring Physician: No admitting provider for patient encounter.                  CHIEF COMPLAINT:  Insomnia    History of Present Illness:   Valeria Carrasquillo is a 40 y.o. female Over more than 15 years pt started having trouble falling a sleep at night for 1-2 hours even dark quit bed room, tried different meds ( ambien, hydroxyzine, and over the over counter, also multiple wakening up at night every 2 hours 15-30 min, in Am still tired weak and fatigue, denies any nabs during the day.       Review of Systems:   Constitutional: As above    Eyes: negative   ENT/oropharynx: negative   Cardiovascular: negative   Respiratory: negative   Gastrointestinal: negative   Genitourinary: negative   Neurological: negative   Musculoskeletal: negative   Integument/breast: negative   Endocrine: negative   Allergic/Immunologic: negative     Past Medical History:  Past Medical History:   Diagnosis Date   • Anxiety    • Depression        Past Surgical History:  No past surgical history on file.     Family History:  No family history on file.     Social History:   Social History     Tobacco Use   • Smoking status: Former Smoker   • Smokeless tobacco: Former User   Substance Use Topics   • Alcohol use: Yes     Comment: rarely        Allergies:  Allergies   Allergen Reactions   • Penicillin G Anaphylaxis   • Penicillins Other (See Comments)     Patient was told she was allegeric as a child        Home Meds:    (Not in a hospital admission)    Objective:    Vitals Ranges:   Heart Rate:  [57] 57  BP: (89)/(61) 89/61  Body mass index is 21.97 kg/m².     Exam:  General Appearance:  WDWN    HEENT:   without obvious abnormality,  Conjunctiva/corneas clear,  Normal external ear canals, no drainage    Clear orsalmucosa,  Mallampati score 3    Neck:  Supple, symmetrical,  trachea midline. No JVD.  Lungs:   Bilateral basal rhonchi bilaterally, respirations unlabored symmetrical wall movement.    Chest wall:  No tenderness or deformity.    Heart:  Regular rate and rhythm, S1 and S2 normal.  Extremities: Trace edema no clubbing or Cyanosis        Data Review:  All labs (24hrs): No results found for this or any previous visit (from the past 24 hour(s)).     Imaging:  No image results found.       ASSESSMENT:  Diagnoses and all orders for this visit:    Chronic insomnia    Anxiety        PLAN:  Sleep hygiene tips discussed in details with pt and plan to follow it with maintaining regular time for sleep and using sleep aid as needed     add trazdone 50/100    Compa Bowman MD. D, ABSM.  6/2/2020  14:56

## 2020-06-04 ENCOUNTER — TELEPHONE (OUTPATIENT)
Dept: FAMILY MEDICINE CLINIC | Facility: CLINIC | Age: 40
End: 2020-06-04

## 2020-06-04 ENCOUNTER — OFFICE VISIT (OUTPATIENT)
Dept: FAMILY MEDICINE CLINIC | Facility: CLINIC | Age: 40
End: 2020-06-04

## 2020-06-04 VITALS
TEMPERATURE: 98.3 F | OXYGEN SATURATION: 98 % | HEART RATE: 64 BPM | WEIGHT: 125 LBS | DIASTOLIC BLOOD PRESSURE: 70 MMHG | SYSTOLIC BLOOD PRESSURE: 110 MMHG | BODY MASS INDEX: 21.34 KG/M2 | HEIGHT: 64 IN

## 2020-06-04 DIAGNOSIS — F41.9 ANXIETY AND DEPRESSION: ICD-10-CM

## 2020-06-04 DIAGNOSIS — F51.01 PRIMARY INSOMNIA: ICD-10-CM

## 2020-06-04 DIAGNOSIS — T78.40XS ALLERGIC STATE, SEQUELA: ICD-10-CM

## 2020-06-04 DIAGNOSIS — F33.0 MILD EPISODE OF RECURRENT MAJOR DEPRESSIVE DISORDER (HCC): ICD-10-CM

## 2020-06-04 DIAGNOSIS — F51.01 PRIMARY INSOMNIA: Primary | ICD-10-CM

## 2020-06-04 DIAGNOSIS — F41.9 ANXIETY: ICD-10-CM

## 2020-06-04 DIAGNOSIS — F32.A ANXIETY AND DEPRESSION: ICD-10-CM

## 2020-06-04 PROCEDURE — 99213 OFFICE O/P EST LOW 20 MIN: CPT | Performed by: FAMILY MEDICINE

## 2020-06-04 RX ORDER — VILAZODONE HYDROCHLORIDE 20 MG/1
20 TABLET ORAL DAILY
Qty: 90 TABLET | Refills: 3 | Status: SHIPPED | OUTPATIENT
Start: 2020-06-04

## 2020-06-04 RX ORDER — VILAZODONE HYDROCHLORIDE 20 MG/1
20 TABLET ORAL DAILY
Qty: 90 TABLET | Refills: 3 | Status: SHIPPED | OUTPATIENT
Start: 2020-06-04 | End: 2020-06-04 | Stop reason: SDUPTHER

## 2020-06-04 RX ORDER — TRAZODONE HYDROCHLORIDE 50 MG/1
50 TABLET ORAL NIGHTLY
Qty: 90 TABLET | Refills: 0 | Status: SHIPPED | OUTPATIENT
Start: 2020-06-04 | End: 2020-07-23

## 2020-06-04 RX ORDER — LEVOCETIRIZINE DIHYDROCHLORIDE 5 MG/1
5 TABLET, FILM COATED ORAL EVERY EVENING
Qty: 90 TABLET | Refills: 2 | Status: SHIPPED | OUTPATIENT
Start: 2020-06-04 | End: 2020-06-04 | Stop reason: SDUPTHER

## 2020-06-04 RX ORDER — TRAZODONE HYDROCHLORIDE 50 MG/1
50 TABLET ORAL NIGHTLY
Qty: 90 TABLET | Refills: 0 | Status: SHIPPED | OUTPATIENT
Start: 2020-06-04 | End: 2020-06-04 | Stop reason: SDUPTHER

## 2020-06-04 RX ORDER — HYDROXYZINE HYDROCHLORIDE 25 MG/1
TABLET, FILM COATED ORAL
Qty: 90 TABLET | Refills: 2 | Status: SHIPPED | OUTPATIENT
Start: 2020-06-04 | End: 2022-02-07

## 2020-06-04 RX ORDER — LEVOCETIRIZINE DIHYDROCHLORIDE 5 MG/1
5 TABLET, FILM COATED ORAL EVERY EVENING
Qty: 90 TABLET | Refills: 2 | Status: SHIPPED | OUTPATIENT
Start: 2020-06-04 | End: 2022-02-07

## 2020-06-04 RX ORDER — ESZOPICLONE 2 MG/1
2 TABLET, FILM COATED ORAL NIGHTLY
Qty: 30 TABLET | Refills: 0 | Status: SHIPPED | OUTPATIENT
Start: 2020-06-04 | End: 2020-06-04 | Stop reason: SDUPTHER

## 2020-06-04 RX ORDER — ESZOPICLONE 2 MG/1
2 TABLET, FILM COATED ORAL NIGHTLY
Qty: 30 TABLET | Refills: 0 | Status: SHIPPED | OUTPATIENT
Start: 2020-06-04 | End: 2020-07-23

## 2020-06-04 NOTE — PROGRESS NOTES
Chief Complaint   Patient presents with   • Insomnia       Subjective   Valeria Carrasquillo is a 40 y.o. female.     History of Present Illness     40-year-old female here to follow-up on insomnia    Been on Lunesta for the past 6 months.  She also takes Viibryd for depression/ anxiety.  Overall doing okay.  States sleep could be better.  She is on a strict routine goes to bed at the same time every night.  Works out and exercises every morning.  Recently saw sleep medicine they recommended trazodone.    Has continued to maintain the routine and work via the COVID pandemic.    Depression anxiety: Has been relatively stable, she will say her main issue is irritability with anger outbursts.  Is been an ongoing issue in her life.  No SI/HI/thoughts of self-harm.    Needs annual exam up-to-date on other health maintenance items    The following portions of the patient's history were reviewed and updated as appropriate: allergies, current medications, past family history, past medical history, past social history, past surgical history and problem list.      Past Medical History:   Diagnosis Date   • Anxiety    • Depression        No past surgical history on file.    No family history on file.    Social History     Socioeconomic History   • Marital status: Single     Spouse name: Not on file   • Number of children: Not on file   • Years of education: Not on file   • Highest education level: Not on file   Tobacco Use   • Smoking status: Former Smoker   • Smokeless tobacco: Former User   Substance and Sexual Activity   • Alcohol use: Yes     Comment: rarely       Current Outpatient Medications on File Prior to Visit   Medication Sig Dispense Refill   • hydrOXYzine (ATARAX) 25 MG tablet for anxiety 30 tablet 5   • [DISCONTINUED] eszopiclone (LUNESTA) 3 MG tablet TAKE ONE TABLET BY MOUTH ONE TIME DAILY IN THE EVENING immediately before bedtime 30 tablet 0   • [DISCONTINUED] levocetirizine (XYZAL) 5 MG tablet Take 1 tablet by mouth  Every Evening. 30 tablet 5   • [DISCONTINUED] vilazodone (VIIBRYD) 20 MG tablet tablet Take 1 tablet by mouth Daily. 90 tablet 3     No current facility-administered medications on file prior to visit.        Review of Systems   Constitutional: Negative for chills and fever.   HENT: Negative for congestion.    Respiratory: Negative for choking and shortness of breath.    Cardiovascular: Negative for chest pain.   Gastrointestinal: Negative for abdominal pain.   Genitourinary: Negative for decreased urine volume.   Musculoskeletal: Negative for arthralgias.   Neurological: Negative for dizziness, weakness, memory problem and confusion.   Psychiatric/Behavioral: Positive for dysphoric mood and sleep disturbance. Negative for decreased concentration, suicidal ideas, negative for hyperactivity and depressed mood. The patient is not nervous/anxious.            Vitals:    06/04/20 0815   BP: 110/70   Pulse: 64   Temp: 98.3 °F (36.8 °C)   SpO2: 98%      Objective   Physical Exam   Constitutional: She is oriented to person, place, and time. She appears well-developed and well-nourished.   HENT:   Head: Normocephalic and atraumatic.   Eyes: Pupils are equal, round, and reactive to light. Conjunctivae and EOM are normal.   Neck: Neck supple.   Cardiovascular: Normal rate, regular rhythm and normal heart sounds.   No murmur heard.  Pulmonary/Chest: Effort normal and breath sounds normal. No respiratory distress.   Abdominal: Soft. Bowel sounds are normal.   Musculoskeletal: Normal range of motion.   Neurological: She is alert and oriented to person, place, and time.   Skin: Skin is warm and dry.   Psychiatric: She has a normal mood and affect.   Nursing note and vitals reviewed.        Assessment/Plan   Problem List Items Addressed This Visit        Other    Mild episode of recurrent major depressive disorder (CMS/HCC)    Relevant Medications    eszopiclone (LUNESTA) 2 MG tablet    traZODone (DESYREL) 50 MG tablet     vilazodone (Viibryd) 20 MG tablet tablet      Other Visit Diagnoses     Primary insomnia    -  Primary    Relevant Medications    eszopiclone (LUNESTA) 2 MG tablet    traZODone (DESYREL) 50 MG tablet    vilazodone (Viibryd) 20 MG tablet tablet    Anxiety and depression        Relevant Medications    eszopiclone (LUNESTA) 2 MG tablet    traZODone (DESYREL) 50 MG tablet    vilazodone (Viibryd) 20 MG tablet tablet    Allergic state, sequela        Relevant Medications    levocetirizine (XYZAL) 5 MG tablet            -Decreasing the dose and weaning off of Lunesta.  From 3 to 2 mg.  -She is to take the trazodone a few hours before bed.  And then try to go without the Lunesta.  She is to walk around without looking at screens calmly and then attempt to go back to bed  -Had recent visit with sleep medicine.  -Let us know any issues / side effects with Lunesta will have follow-up in 3 to 4 weeks    The patient has read and signed the [facility] Controlled Substance Contract.  I will continue to see patient for regular follow up appointments.  They are well controlled on their medication.  ELIZA is updated every 3 months. The patient is aware of the potential for addiction and dependence.           Oralia Shore MD

## 2020-06-04 NOTE — TELEPHONE ENCOUNTER
PATIENT CALLED AND STATED THAT HER PRESCRIPTIONS WERE SENT TO [facility] INSTEAD OF [facility]. SHE WOULD LIKE TO KNOW IF THEY COULD BE SENT TO [facility] INSTEAD. SHE STATES SHE DOES NOT RECALL WHICH MEDICATIONS WERE SENT IN.PLEASE ADVISE.     PHARMACY IS [facility] ON [facility]    PATIENT CALL BACK [phone]

## 2020-07-23 ENCOUNTER — OFFICE VISIT (OUTPATIENT)
Dept: FAMILY MEDICINE CLINIC | Facility: CLINIC | Age: 40
End: 2020-07-23

## 2020-07-23 ENCOUNTER — RESULTS ENCOUNTER (OUTPATIENT)
Dept: FAMILY MEDICINE CLINIC | Facility: CLINIC | Age: 40
End: 2020-07-23

## 2020-07-23 VITALS
TEMPERATURE: 98.1 F | WEIGHT: 125.4 LBS | BODY MASS INDEX: 21.41 KG/M2 | HEIGHT: 64 IN | DIASTOLIC BLOOD PRESSURE: 78 MMHG | SYSTOLIC BLOOD PRESSURE: 110 MMHG | HEART RATE: 55 BPM | OXYGEN SATURATION: 97 %

## 2020-07-23 DIAGNOSIS — F32.89 OTHER DEPRESSION: Primary | ICD-10-CM

## 2020-07-23 DIAGNOSIS — F51.01 PRIMARY INSOMNIA: ICD-10-CM

## 2020-07-23 DIAGNOSIS — F41.9 ANXIETY: ICD-10-CM

## 2020-07-23 DIAGNOSIS — Z79.899 HIGH RISK MEDICATION USE: ICD-10-CM

## 2020-07-23 PROCEDURE — 99214 OFFICE O/P EST MOD 30 MIN: CPT | Performed by: FAMILY MEDICINE

## 2020-07-23 RX ORDER — ESZOPICLONE 3 MG/1
3 TABLET, FILM COATED ORAL NIGHTLY
Qty: 30 TABLET | Refills: 0 | Status: SHIPPED | OUTPATIENT
Start: 2020-07-23 | End: 2022-05-13

## 2020-07-23 NOTE — PROGRESS NOTES
CC: Insomnia    Subjective   Valeria Carrasquillo is a 40 y.o. female.     History of Present Illness   40-year-old female here for follow-up on insomnia and anxiety    Computer systems down today-will refill later.     She did not do well with the trazodone.  Says it did not seem to have any effect at all.  States she has been stable right now with her Viibryd and her Lunesta.  She is looking into resuming counseling and seeing a psychiatrist to potentially consider a mood stabilizer.    In the past she says she is tried Lexapro/Celexa/Wellbutrin.  She has never had any luck with either of these.  Primary issue is anger outburst.  And increased irritability.    She denies any SI/HI/thoughts of self-harm.    Has been at a heightened level of stress due to a coronavirus.  She needs to get a urine drug screen prior to any Lunesta refills.      The following portions of the patient's history were reviewed and updated as appropriate: allergies, current medications, past family history, past medical history, past social history, past surgical history and problem list.      Past Medical History:   Diagnosis Date   • Anxiety    • Depression        No past surgical history on file.    No family history on file.    Social History     Socioeconomic History   • Marital status: Single     Spouse name: Not on file   • Number of children: Not on file   • Years of education: Not on file   • Highest education level: Not on file   Tobacco Use   • Smoking status: Former Smoker   • Smokeless tobacco: Former User   Substance and Sexual Activity   • Alcohol use: Yes     Comment: rarely       Current Outpatient Medications on File Prior to Visit   Medication Sig Dispense Refill   • eszopiclone (LUNESTA) 2 MG tablet Take 1 tablet by mouth Every Night. Take immediately before bedtime 30 tablet 0   • hydrOXYzine (ATARAX) 25 MG tablet for anxiety 90 tablet 2   • levocetirizine (XYZAL) 5 MG tablet Take 1 tablet by mouth Every Evening. 90 tablet 2    • traZODone (DESYREL) 50 MG tablet Take 1 tablet by mouth Every Night. 90 tablet 0   • vilazodone (Viibryd) 20 MG tablet tablet Take 1 tablet by mouth Daily. 90 tablet 3     No current facility-administered medications on file prior to visit.        Review of Systems   Constitutional: Negative for activity change, chills and fever.   HENT: Negative for congestion, postnasal drip and rhinorrhea.    Eyes: Negative for blurred vision and pain.   Respiratory: Negative for cough, chest tightness and shortness of breath.    Cardiovascular: Negative for chest pain.   Gastrointestinal: Negative for abdominal pain, constipation, diarrhea, nausea and vomiting.   Endocrine: Negative for cold intolerance and heat intolerance.   Genitourinary: Negative for decreased urine volume, dysuria and frequency.   Musculoskeletal: Negative for arthralgias, back pain and myalgias.   Skin: Negative for rash and skin lesions.   Neurological: Negative for dizziness and confusion.   Psychiatric/Behavioral: Negative for agitation, behavioral problems and depressed mood.           Vitals:    07/23/20 0906   BP: 110/78   Pulse: 55   Temp: 98.1 °F (36.7 °C)   SpO2: 97%      Objective   Physical Exam   Constitutional: She is oriented to person, place, and time. She appears well-developed and well-nourished.   HENT:   Head: Normocephalic and atraumatic.   Eyes: Pupils are equal, round, and reactive to light.   Cardiovascular: Normal rate, regular rhythm and normal heart sounds.   No murmur heard.  Pulmonary/Chest: Effort normal and breath sounds normal. No respiratory distress.   Abdominal: Soft. Bowel sounds are normal.   Musculoskeletal: Normal range of motion.   Neurological: She is alert and oriented to person, place, and time.   Skin: Skin is warm and dry.   Nursing note and vitals reviewed.        Assessment/Plan   Problem List Items Addressed This Visit        Other    Anxiety      Other Visit Diagnoses     Other depression    -  Primary     High risk medication use        Relevant Orders    Compliance Drug Analysis, Ur - Urine, Clean Catch    Primary insomnia        Relevant Orders    Compliance Drug Analysis, Ur - Urine, Clean Catch            -Will send to Dr Barillas wanting second opinion on sleep issues  -Needs UDS before any further scripts of Lunesta    The patient has read and signed the [facility] Controlled Substance Contract.  I will continue to see patient for regular follow up appointments.  They are well controlled on their medication.  ELIZA is updated every 3 months. The patient is aware of the potential for addiction and dependence.    HM: lsil pap due for recheck pap Jan 2021. Normal mammogram in Feb 2020. Sees GYN. Labs 5 mo ago normal Lipids. Will do annual beginning 2021     F/u 4 mo patient to schedule as computers are down.     Oralia Shore MD

## 2020-07-30 ENCOUNTER — OFFICE VISIT (OUTPATIENT)
Dept: SLEEP MEDICINE | Facility: HOSPITAL | Age: 40
End: 2020-07-30

## 2020-07-30 VITALS
BODY MASS INDEX: 19.46 KG/M2 | SYSTOLIC BLOOD PRESSURE: 95 MMHG | HEIGHT: 64 IN | HEART RATE: 59 BPM | DIASTOLIC BLOOD PRESSURE: 57 MMHG | WEIGHT: 114 LBS

## 2020-07-30 DIAGNOSIS — G47.8 NON-RESTORATIVE SLEEP: ICD-10-CM

## 2020-07-30 DIAGNOSIS — G47.10 HYPERSOMNIA: Primary | ICD-10-CM

## 2020-07-30 DIAGNOSIS — G25.81 RESTLESS LEGS: ICD-10-CM

## 2020-07-30 DIAGNOSIS — F51.01 PRIMARY INSOMNIA: ICD-10-CM

## 2020-07-30 PROCEDURE — 99213 OFFICE O/P EST LOW 20 MIN: CPT | Performed by: FAMILY MEDICINE

## 2020-07-30 PROCEDURE — G0463 HOSPITAL OUTPT CLINIC VISIT: HCPCS

## 2020-07-30 NOTE — PROGRESS NOTES
[facility] New Patient[facility]Consultation       Reason for Consultation: Insomnia with anxiety      Patient Care Team:  Oralia Shore MD as PCP - General (Family Medicine)  Maci Barillas MD as Consulting Physician (Sleep Medicine)      History of present illness:  Thank you for asking me to see your patient.  The patient is a 40 y.o. female presents with concern for sleep disorder due to history of insomnia and anxiety.  Patient is on Viibryd hydroxyzine and Lunesta.  Reports that she can fall asleep without medication or stay asleep.  She also sweats a lot during her sleep which keeps her up at night.  No history of prior sleep study.  No history of tonsillectomy.    Sleep Schedule:  Bed time: 7 PM to 8 PM on weekdays 7:30 PM to 3 PM on weekends  Sleep latency: 4-hour without sleep.  Wake time: Weekdays 2:15 AM to 2:30 AM; weekends 4 AM to 5 AM  Average hours slept: Weekdays 6-7 weekends 8-9  Non-restorative sleep: Yes  Number of naps per day: 0  Rotating shifts?:  No  Nocturia: 1x  Electronics in bedroom: N    Excessive daytime sleepiness or drowsiness:Y  Any accidents at work due to sleepiness in the last 5 years:N  Any difficulty driving due to sleepiness or being drowsy: N  Weight changed in the last 5 years:Y - GAINED 20 AND LOST 25 LBS    Snoring:N  Witnessed apneas:N  Have you ever awakened gasping for breath, coughing, choking or respiratory discomfort: N  Morning headaches: N  Awaken with a sore throat or dry mouth: N    Any reports of leg jerking at night: N  Urge sensations: Y  Does pain disrupt sleep: N  Sweating during sleep: Y  Teeth grinding: N    Any sudden episodes of sleep during the day: N  Sleep paralysis/hallucinations: N  Muscle weakness with laughing/anger: N  Nightmares: N  Sleep walking: N    Are you sleepy when you increase your sleep time: N  Do you sleep better away from your own bed: N    ESS: 11    Social History:-year-old ; 1 cigarette a day from ages [age];  "no alcohol use; 2 caffeinated beverages a day    Review of Systems:    A complete review of systems was done and all were negative with the exception of frequent urination nasal congestion anxiety depression    Allergies:  Penicillin g and Penicillins    Family History: KENIA no       Current Outpatient Medications:   •  eszopiclone (LUNESTA) 3 MG tablet, Take 1 tablet by mouth Every Night. Take immediately before bedtime, Disp: 30 tablet, Rfl: 0  •  hydrOXYzine (ATARAX) 25 MG tablet, for anxiety, Disp: 90 tablet, Rfl: 2  •  levocetirizine (XYZAL) 5 MG tablet, Take 1 tablet by mouth Every Evening., Disp: 90 tablet, Rfl: 2  •  vilazodone (Viibryd) 20 MG tablet tablet, Take 1 tablet by mouth Daily., Disp: 90 tablet, Rfl: 3    Vital Signs:    Vitals:    07/30/20 0700   BP: 95/57   Pulse: 59   Weight: 51.7 kg (114 lb)   Height: 162.6 cm (64\")      Body mass index is 19.57 kg/m².  Neck Circumference: 12.5 inches      Physical Exam:   General Alert and oriented. No acute distress noted   Pharynx/Throat Moist mucous membranes.   Head Normocephalic. Symmetrical. Atraumatic.    Nose No septal deviation. No drainage   Chest Wall Normal shape. Symmetric expansion with respiration. No tenderness.   Neck Trachea midline, no thyromegaly or adenopathy    Lungs Clear to auscultation bilaterally. No wheezes. No rhonchi. No rales. Respirations regular, even and unlabored.   Heart Regular rhythm and normal rate. Normal S1 and S2. No murmur   Abdomen Soft, non-tender and non-distended. Normal bowel sounds. No masses.   Extremities Moves all extremities well. No edema   Psychiatric Normal mood and affect.       Impression:  1. Hypersomnia    2. Primary insomnia    3. Non-restorative sleep    4. Restless legs        Plan:    Good sleep hygiene measures should be maintained.      I discussed the pathophysiology of obstructive sleep apnea with the patient.  We discussed the adverse outcomes associated with untreated sleep-disordered " breathing.  We discussed treatment modalities of obstructive sleep apnea including CPAP device as well as oral mandibular advancement device. Sleep study will be scheduled to establish definitive diagnosis of sleep disorder breathing.  Weight loss will be strongly beneficial in order to reduce the severity of sleep-disordered breathing.  Patient has narrow oropharyngeal structure.  Caution during activities that require prolonged concentration is strongly advised.  Patient will be notified of sleep study results after sleep study is completed.  If sleep apnea is only mild,  oral mandibular advancement device may be one of the treatment options.  However if sleep apnea is moderately severe, CPAP treatment will be strongly encouraged.  The patient is not opposed to treatment with CPAP device if we confirm significant obstructive sleep apnea on polysomnography.    Will refer to CBT-I with Dr. Boston.  To try to wean off of Lunesta and topical primary insomnia.    Patient will see how she does with her insomnia she sleeps without hydroxyzine or Lunesta to see if restless leg syndrome symptoms are prevalent.  If they are then we will consider medication for RLS.    We will follow-up home sleep study given hypersomnia nonrestorative sleep; discussed that 20% of patients with obstructive sleep apnea can present paradoxically with insomnia and usually there are women in their 40s to 50s.  Patient also has endorsed sweating spells during sleep; has had a full gynecological work-up and has regular periods there is no concern for hot flashes secondary to menopause.  Discussed that night sweats could be secondary to obstructive sleep apnea as well.    Thank you for allowing me to participate in your patient's care.    Maci Barillas MD  Sleep Medicine  07/30/20  08:57

## 2020-08-06 ENCOUNTER — HOSPITAL ENCOUNTER (OUTPATIENT)
Dept: SLEEP MEDICINE | Facility: HOSPITAL | Age: 40
Discharge: HOME | End: 2020-08-06
Admitting: FAMILY MEDICINE

## 2020-08-06 DIAGNOSIS — G47.8 NON-RESTORATIVE SLEEP: ICD-10-CM

## 2020-08-06 DIAGNOSIS — G47.10 HYPERSOMNIA: ICD-10-CM

## 2020-08-06 PROCEDURE — 95806 SLEEP STUDY UNATT&RESP EFFT: CPT

## 2020-08-06 PROCEDURE — 95806 SLEEP STUDY UNATT&RESP EFFT: CPT | Performed by: FAMILY MEDICINE

## 2020-08-13 ENCOUNTER — TELEPHONE (OUTPATIENT)
Dept: SLEEP MEDICINE | Facility: HOSPITAL | Age: 40
End: 2020-08-13

## 2020-08-26 DIAGNOSIS — R61 NIGHT SWEATS: ICD-10-CM

## 2020-08-26 DIAGNOSIS — R23.2 HOT FLASHES: Primary | ICD-10-CM

## 2021-05-21 ENCOUNTER — IMMUNIZATION (OUTPATIENT)
Dept: VACCINE CLINIC | Facility: HOSPITAL | Age: 41
End: 2021-05-21

## 2021-05-21 PROCEDURE — 91300 HC SARSCOV02 VAC 30MCG/0.3ML IM: CPT | Performed by: OBSTETRICS & GYNECOLOGY

## 2021-05-21 PROCEDURE — 0001A: CPT | Performed by: OBSTETRICS & GYNECOLOGY

## 2021-06-11 ENCOUNTER — IMMUNIZATION (OUTPATIENT)
Dept: VACCINE CLINIC | Facility: HOSPITAL | Age: 41
End: 2021-06-11

## 2021-06-11 PROCEDURE — 91300 HC SARSCOV02 VAC 30MCG/0.3ML IM: CPT | Performed by: OBSTETRICS & GYNECOLOGY

## 2021-06-11 PROCEDURE — 0002A: CPT | Performed by: OBSTETRICS & GYNECOLOGY

## 2021-09-22 ENCOUNTER — TELEPHONE (OUTPATIENT)
Dept: FAMILY MEDICINE CLINIC | Facility: CLINIC | Age: 41
End: 2021-09-22

## 2021-09-22 NOTE — TELEPHONE ENCOUNTER
Caller: Valeria Carrasquillo    Relationship to patient: Self    Best call back number: [phone]    Date of exposure: 9/22/21    Additional information or concerns: PATIENT DAUGHTER WHO LIVES WITH HER HAS TESTED POSITIVE FOR COVID WANTING TO KNOW SHOULD SHE SHOULD DO?     PLEASE CALL AND ADVISE

## 2021-09-22 NOTE — TELEPHONE ENCOUNTER
RETURNED CALL. PT ADVISES THAT HER DAUGHTER TESTED POSITIVE FOR COVID TODAY 09/22/21 AND WANTED TO KNOW IF SHE SHOULD QUARANTINE. I ADVISED TO PT TO QUARANTINE FOR 10 TO 14 DAYS. PT ADVISED THAT SHE HAD NO SYMPTOMS AT THIS TIME. I ADVISED HER TO WAIT APPROXIMATELY 3 TO 5 DAYS BEFORE BEFORE GETTING TESTED UNLESS SHE STARTED EXPERIENCING SYMPTOMS, THEN SHE SHOULD BE TESTED SOONER.

## 2022-02-07 ENCOUNTER — TELEPHONE (OUTPATIENT)
Dept: FAMILY MEDICINE CLINIC | Facility: CLINIC | Age: 42
End: 2022-02-07

## 2022-02-07 DIAGNOSIS — J30.89 NON-SEASONAL ALLERGIC RHINITIS, UNSPECIFIED TRIGGER: Primary | ICD-10-CM

## 2022-02-07 RX ORDER — FEXOFENADINE HCL AND PSEUDOEPHEDRINE HCI 180; 240 MG/1; MG/1
1 TABLET, EXTENDED RELEASE ORAL DAILY
Qty: 30 TABLET | Refills: 0 | Status: SHIPPED | OUTPATIENT
Start: 2022-02-07 | End: 2022-02-08 | Stop reason: SDUPTHER

## 2022-02-07 NOTE — TELEPHONE ENCOUNTER
Attempted to call pt. No Answer. Left voicemail advising that a prescription had been sent for Yudith TUTTLE

## 2022-02-07 NOTE — TELEPHONE ENCOUNTER
Caller: Valeria Carrasquillo    Relationship: Self    Best call back number: [phone]    What medication are you requesting: DILIA ORTEZ     What are your current symptoms:  CONGESTION/RUNNY NOSE    How long have you been experiencing symptoms: CHRONIC ISSUE    Have you had these symptoms before:    [x] Yes  [] No    Have you been treated for these symptoms before:   [] Yes  [x] No    If a prescription is needed, what is your preferred pharmacy and phone number: [id number] - [place], [place] - [address] [phone] [facility] [phone] FX     Additional notes: PLEASE CALL.

## 2022-02-07 NOTE — TELEPHONE ENCOUNTER
Notify patient that I have sent the Allegra-D to her pharmacy.  She is to stop the Xyzal hydroxyzine while on this medication.  If no improvement, will need to be seen in office.

## 2022-02-08 DIAGNOSIS — J30.89 NON-SEASONAL ALLERGIC RHINITIS, UNSPECIFIED TRIGGER: ICD-10-CM

## 2022-02-08 RX ORDER — FEXOFENADINE HCL AND PSEUDOEPHEDRINE HCI 180; 240 MG/1; MG/1
1 TABLET, EXTENDED RELEASE ORAL DAILY
Qty: 30 TABLET | Refills: 0 | Status: SHIPPED | OUTPATIENT
Start: 2022-02-08 | End: 2022-02-21

## 2022-02-08 NOTE — TELEPHONE ENCOUNTER
PATIENT IS ASKING THAT THE GENERIC 12- HOUR BE CALLED IN TO [facility] INSTEAD OF [facility].     PLEASE ADVISE ASAP.     CONTACT: [phone] (h)

## 2022-02-19 DIAGNOSIS — J30.89 NON-SEASONAL ALLERGIC RHINITIS, UNSPECIFIED TRIGGER: ICD-10-CM

## 2022-02-21 RX ORDER — FEXOFENADINE HYDROCHLORIDE AND PSEUDOEPHEDRINE HYDROCHLORIDE 180; 240 MG/1; MG/1
TABLET, FILM COATED, EXTENDED RELEASE ORAL
Qty: 30 TABLET | Refills: 0 | Status: SHIPPED | OUTPATIENT
Start: 2022-02-21 | End: 2022-04-18

## 2022-03-04 ENCOUNTER — APPOINTMENT (OUTPATIENT)
Dept: WOMENS IMAGING | Facility: HOSPITAL | Age: 42
End: 2022-03-04

## 2022-03-04 PROCEDURE — 77063 BREAST TOMOSYNTHESIS BI: CPT | Performed by: RADIOLOGY

## 2022-03-04 PROCEDURE — 77067 SCR MAMMO BI INCL CAD: CPT | Performed by: RADIOLOGY

## 2022-04-12 DIAGNOSIS — J30.89 NON-SEASONAL ALLERGIC RHINITIS, UNSPECIFIED TRIGGER: ICD-10-CM

## 2022-04-12 RX ORDER — FEXOFENADINE HYDROCHLORIDE AND PSEUDOEPHEDRINE HYDROCHLORIDE 180; 240 MG/1; MG/1
TABLET, FILM COATED, EXTENDED RELEASE ORAL
Qty: 30 TABLET | Refills: 0 | OUTPATIENT
Start: 2022-04-12

## 2022-04-15 ENCOUNTER — OFFICE VISIT (OUTPATIENT)
Dept: FAMILY MEDICINE CLINIC | Facility: CLINIC | Age: 42
End: 2022-04-15

## 2022-04-15 VITALS
HEART RATE: 60 BPM | HEIGHT: 64 IN | BODY MASS INDEX: 24.75 KG/M2 | WEIGHT: 145 LBS | TEMPERATURE: 97.3 F | SYSTOLIC BLOOD PRESSURE: 108 MMHG | DIASTOLIC BLOOD PRESSURE: 72 MMHG | OXYGEN SATURATION: 100 %

## 2022-04-15 DIAGNOSIS — B37.9 YEAST INFECTION: ICD-10-CM

## 2022-04-15 DIAGNOSIS — J01.00 ACUTE NON-RECURRENT MAXILLARY SINUSITIS: Primary | ICD-10-CM

## 2022-04-15 PROCEDURE — 99213 OFFICE O/P EST LOW 20 MIN: CPT | Performed by: FAMILY MEDICINE

## 2022-04-15 RX ORDER — AZITHROMYCIN 250 MG/1
TABLET, FILM COATED ORAL
Qty: 6 TABLET | Refills: 0 | Status: SHIPPED | OUTPATIENT
Start: 2022-04-15 | End: 2022-05-13

## 2022-04-15 RX ORDER — BREXPIPRAZOLE 2 MG/1
TABLET ORAL
COMMUNITY
Start: 2022-04-12

## 2022-04-15 RX ORDER — FLUCONAZOLE 150 MG/1
150 TABLET ORAL ONCE
Qty: 1 TABLET | Refills: 1 | Status: SHIPPED | OUTPATIENT
Start: 2022-04-15 | End: 2022-04-15

## 2022-04-15 NOTE — PROGRESS NOTES
"  Chief Complaint   Patient presents with   • Cough   • URI       Upper Respiratory Infection: Patient complains of symptoms of a sinusitis. Symptoms include congestion. Onset of symptoms was 2 weeks ago, unchanged since that time. She also c/o facial pain for the past 2 weeks .  She is drinking moderate amounts of fluids. Evaluation to date: none. Treatment to date: none.  Ill contacts at home or school or work discussed.      Vitals:    04/15/22 1422   BP: 108/72   Pulse: 60   Temp: 97.3 °F (36.3 °C)   SpO2: 100%   Weight: 65.8 kg (145 lb)   Height: 162.6 cm (64.02\")     Gen: alert  Ears: Tm's clear without redness  Nose:  Congestion  Throat:  Red without exudate, some drainage, tonsils okay  Neck: no LAD  Lung: good air movement, regular RR  Heart: RR without murmur  Skin: no rash.      Assessment/Plan   Diagnoses and all orders for this visit:    1. Acute non-recurrent maxillary sinusitis (Primary)  -     azithromycin (Zithromax Z-Misha) 250 MG tablet; Take 2 tablets the first day, then 1 tablet daily for 4 days.  Dispense: 6 tablet; Refill: 0    2. Yeast infection  -     fluconazole (Diflucan) 150 MG tablet; Take 1 tablet by mouth 1 (One) Time for 1 dose.  Dispense: 1 tablet; Refill: 1      Stahist and conservative care for sinusitis type symptoms  Lungs are clear on exam, no fevers, no myalgias and so will not check for flu  Penicillin allergy, if symptoms are persistent can trial azitrhomycin         Tylenol or Advil as needed for pain, fever, muscle aches  Plenty of fluids  Hand washing discussed  Off work or school note given if needed.  Warm tea for throat.  Pros and cons of antibiotic use discussed    Dr. Oralia Shore MD  Family [place], [place]  [facility]        "

## 2022-04-16 DIAGNOSIS — J30.89 NON-SEASONAL ALLERGIC RHINITIS, UNSPECIFIED TRIGGER: ICD-10-CM

## 2022-04-18 RX ORDER — FEXOFENADINE HYDROCHLORIDE AND PSEUDOEPHEDRINE HYDROCHLORIDE 180; 240 MG/1; MG/1
TABLET, FILM COATED, EXTENDED RELEASE ORAL
Qty: 30 TABLET | Refills: 0 | Status: SHIPPED | OUTPATIENT
Start: 2022-04-18 | End: 2022-05-13 | Stop reason: SDUPTHER

## 2022-04-19 ENCOUNTER — TELEPHONE (OUTPATIENT)
Dept: FAMILY MEDICINE CLINIC | Facility: CLINIC | Age: 42
End: 2022-04-19

## 2022-04-19 DIAGNOSIS — R05.9 COUGH: Primary | ICD-10-CM

## 2022-04-19 RX ORDER — BENZONATATE 100 MG/1
100 CAPSULE ORAL 3 TIMES DAILY PRN
Qty: 30 CAPSULE | Refills: 1 | Status: SHIPPED | OUTPATIENT
Start: 2022-04-19 | End: 2022-05-13

## 2022-04-19 RX ORDER — GUAIFENESIN AND CODEINE PHOSPHATE 100; 10 MG/5ML; MG/5ML
5 SOLUTION ORAL 3 TIMES DAILY PRN
Qty: 180 ML | Refills: 0 | Status: SHIPPED | OUTPATIENT
Start: 2022-04-19 | End: 2022-05-13

## 2022-04-19 NOTE — TELEPHONE ENCOUNTER
I sent in cough suppressant capsules as well as cough syrup for her to try.  Can use heating pads, cool compresses, naproxen can help with rib pain as it is a stronger anti-inflammatory or Aleve over-the-counter

## 2022-04-19 NOTE — TELEPHONE ENCOUNTER
Caller: Valeria Carrasquillo    Relationship to patient: Self    Best call back number: [phone]    Patient is needing: SEEN IN OFFICE 4-15-22. PATIENT WAS DIAGNOSED WITH SINUS INFECTION. PATIENT HAS HAD LEFT RIB PAIN FOR LAST 2 DAYS, ESPECIALLY WHEN COUGHING. CAN'T LAY ON LEFT SIDE.   ASKING WHAT CAN BE DONE TO EASE PAIN  PLEASE ADVISE

## 2022-04-26 ENCOUNTER — TELEPHONE (OUTPATIENT)
Dept: FAMILY MEDICINE CLINIC | Facility: CLINIC | Age: 42
End: 2022-04-26

## 2022-04-26 DIAGNOSIS — F33.0 MILD EPISODE OF RECURRENT MAJOR DEPRESSIVE DISORDER: ICD-10-CM

## 2022-04-26 DIAGNOSIS — D50.9 IRON DEFICIENCY ANEMIA, UNSPECIFIED IRON DEFICIENCY ANEMIA TYPE: ICD-10-CM

## 2022-04-26 DIAGNOSIS — Z00.00 ANNUAL PHYSICAL EXAM: Primary | ICD-10-CM

## 2022-04-26 DIAGNOSIS — F41.1 GAD (GENERALIZED ANXIETY DISORDER): ICD-10-CM

## 2022-05-07 ENCOUNTER — LAB (OUTPATIENT)
Dept: LAB | Facility: HOSPITAL | Age: 42
End: 2022-05-07

## 2022-05-07 LAB
ALBUMIN SERPL-MCNC: 4.2 G/DL (ref 3.5–5.2)
ALBUMIN/GLOB SERPL: 1.5 G/DL
ALP SERPL-CCNC: 52 U/L (ref 39–117)
ALT SERPL W P-5'-P-CCNC: 23 U/L (ref 1–33)
ANION GAP SERPL CALCULATED.3IONS-SCNC: 9.8 MMOL/L (ref 5–15)
AST SERPL-CCNC: 27 U/L (ref 1–32)
BASOPHILS # BLD AUTO: 0.05 10*3/MM3 (ref 0–0.2)
BASOPHILS NFR BLD AUTO: 0.8 % (ref 0–1.5)
BILIRUB SERPL-MCNC: 0.2 MG/DL (ref 0–1.2)
BUN SERPL-MCNC: 24 MG/DL (ref 6–20)
BUN/CREAT SERPL: 26.4 (ref 7–25)
CALCIUM SPEC-SCNC: 8.9 MG/DL (ref 8.6–10.5)
CHLORIDE SERPL-SCNC: 104 MMOL/L (ref 98–107)
CHOLEST SERPL-MCNC: 166 MG/DL (ref 0–200)
CO2 SERPL-SCNC: 27.2 MMOL/L (ref 22–29)
CREAT SERPL-MCNC: 0.91 MG/DL (ref 0.57–1)
DEPRECATED RDW RBC AUTO: 44.2 FL (ref 37–54)
EGFRCR SERPLBLD CKD-EPI 2021: 80.4 ML/MIN/1.73
EOSINOPHIL # BLD AUTO: 0.16 10*3/MM3 (ref 0–0.4)
EOSINOPHIL NFR BLD AUTO: 2.4 % (ref 0.3–6.2)
ERYTHROCYTE [DISTWIDTH] IN BLOOD BY AUTOMATED COUNT: 12.1 % (ref 12.3–15.4)
GLOBULIN UR ELPH-MCNC: 2.8 GM/DL
GLUCOSE SERPL-MCNC: 70 MG/DL (ref 65–99)
HCT VFR BLD AUTO: 39.4 % (ref 34–46.6)
HDLC SERPL-MCNC: 65 MG/DL (ref 40–60)
HGB BLD-MCNC: 13.7 G/DL (ref 12–15.9)
IMM GRANULOCYTES # BLD AUTO: 0.01 10*3/MM3 (ref 0–0.05)
IMM GRANULOCYTES NFR BLD AUTO: 0.2 % (ref 0–0.5)
LDLC SERPL CALC-MCNC: 87 MG/DL (ref 0–100)
LDLC/HDLC SERPL: 1.32 {RATIO}
LYMPHOCYTES # BLD AUTO: 2.52 10*3/MM3 (ref 0.7–3.1)
LYMPHOCYTES NFR BLD AUTO: 38 % (ref 19.6–45.3)
MCH RBC QN AUTO: 34 PG (ref 26.6–33)
MCHC RBC AUTO-ENTMCNC: 34.8 G/DL (ref 31.5–35.7)
MCV RBC AUTO: 97.8 FL (ref 79–97)
MONOCYTES # BLD AUTO: 0.61 10*3/MM3 (ref 0.1–0.9)
MONOCYTES NFR BLD AUTO: 9.2 % (ref 5–12)
NEUTROPHILS NFR BLD AUTO: 3.28 10*3/MM3 (ref 1.7–7)
NEUTROPHILS NFR BLD AUTO: 49.4 % (ref 42.7–76)
NRBC BLD AUTO-RTO: 0 /100 WBC (ref 0–0.2)
PLATELET # BLD AUTO: 312 10*3/MM3 (ref 140–450)
PMV BLD AUTO: 8.6 FL (ref 6–12)
POTASSIUM SERPL-SCNC: 4 MMOL/L (ref 3.5–5.2)
PROT SERPL-MCNC: 7 G/DL (ref 6–8.5)
RBC # BLD AUTO: 4.03 10*6/MM3 (ref 3.77–5.28)
SODIUM SERPL-SCNC: 141 MMOL/L (ref 136–145)
TRIGL SERPL-MCNC: 75 MG/DL (ref 0–150)
VLDLC SERPL-MCNC: 14 MG/DL (ref 5–40)
WBC NRBC COR # BLD: 6.63 10*3/MM3 (ref 3.4–10.8)

## 2022-05-07 PROCEDURE — 85025 COMPLETE CBC W/AUTO DIFF WBC: CPT | Performed by: FAMILY MEDICINE

## 2022-05-07 PROCEDURE — 36415 COLL VENOUS BLD VENIPUNCTURE: CPT | Performed by: FAMILY MEDICINE

## 2022-05-07 PROCEDURE — 80053 COMPREHEN METABOLIC PANEL: CPT | Performed by: FAMILY MEDICINE

## 2022-05-07 PROCEDURE — 80061 LIPID PANEL: CPT | Performed by: FAMILY MEDICINE

## 2022-05-12 RX ORDER — FLUCONAZOLE 150 MG/1
TABLET ORAL
COMMUNITY
Start: 2022-04-15 | End: 2022-05-13

## 2022-05-13 ENCOUNTER — OFFICE VISIT (OUTPATIENT)
Dept: FAMILY MEDICINE CLINIC | Facility: CLINIC | Age: 42
End: 2022-05-13

## 2022-05-13 VITALS
BODY MASS INDEX: 24.75 KG/M2 | HEART RATE: 74 BPM | TEMPERATURE: 98 F | SYSTOLIC BLOOD PRESSURE: 104 MMHG | HEIGHT: 64 IN | WEIGHT: 145 LBS | OXYGEN SATURATION: 99 % | DIASTOLIC BLOOD PRESSURE: 76 MMHG

## 2022-05-13 DIAGNOSIS — J30.89 NON-SEASONAL ALLERGIC RHINITIS, UNSPECIFIED TRIGGER: ICD-10-CM

## 2022-05-13 DIAGNOSIS — Z00.00 ANNUAL PHYSICAL EXAM: Primary | ICD-10-CM

## 2022-05-13 PROCEDURE — 99396 PREV VISIT EST AGE 40-64: CPT | Performed by: FAMILY MEDICINE

## 2022-05-13 RX ORDER — FEXOFENADINE HCL AND PSEUDOEPHEDRINE HCI 180; 240 MG/1; MG/1
1 TABLET, EXTENDED RELEASE ORAL DAILY
Qty: 30 TABLET | Refills: 3 | Status: SHIPPED | OUTPATIENT
Start: 2022-05-13 | End: 2022-10-06

## 2022-05-13 NOTE — PROGRESS NOTES
Chief Complaint   Patient presents with   • Annual Exam       Subjective     Valeria Carrasquillo is a 42 y.o. female and is here for a yearly physical exam. The patient reports no problems.    Do you take any herbs or supplements that were not prescribed by a doctor? no. If so, these will be added to active medication list.    The 10-year ASCVD risk score (Romulo KEENAN Jr., et al., 2013) is: 0.2%    Values used to calculate the score:      Age: 42 years      Sex: Female      Is Non- : No      Diabetic: No      Tobacco smoker: No      Systolic Blood Pressure: 104 mmHg      Is BP treated: No      HDL Cholesterol: 65 mg/dL      Total Cholesterol: 166 mg/dL    Past Medical History:   Diagnosis Date   • Anxiety    • Depression        No past surgical history on file.    No family history on file.    Social History     Socioeconomic History   • Marital status:    Tobacco Use   • Smoking status: Former Smoker   • Smokeless tobacco: Former User   Substance and Sexual Activity   • Alcohol use: Yes     Comment: rarely       Current Outpatient Medications on File Prior to Visit   Medication Sig Dispense Refill   • Rexulti 2 MG tablet      • vilazodone (Viibryd) 20 MG tablet tablet Take 1 tablet by mouth Daily. 90 tablet 3   • [DISCONTINUED] Allegra-D Allergy & Congestion 180-240 MG per 24 hr tablet TAKE 1 TABLET BY MOUTH ONE TIME DAILY 30 tablet 0   • [DISCONTINUED] azithromycin (Zithromax Z-Misha) 250 MG tablet Take 2 tablets the first day, then 1 tablet daily for 4 days. 6 tablet 0   • [DISCONTINUED] benzonatate (Tessalon Perles) 100 MG capsule Take 1 capsule by mouth 3 (Three) Times a Day As Needed for Cough. 30 capsule 1   • [DISCONTINUED] eszopiclone (LUNESTA) 3 MG tablet Take 1 tablet by mouth Every Night. Take immediately before bedtime 30 tablet 0   • [DISCONTINUED] fluconazole (DIFLUCAN) 150 MG tablet      • [DISCONTINUED] guaiFENesin-codeine (GUAIFENESIN AC) 100-10 MG/5ML liquid Take 5 mL by mouth 3  "(Three) Times a Day As Needed for Cough. 180 mL 0     No current facility-administered medications on file prior to visit.       Review of Systems   Constitutional: Negative for fever.   HENT: Negative for congestion.    Respiratory: Negative for shortness of breath.    Cardiovascular: Negative for chest pain.   Gastrointestinal: Negative for abdominal pain.   Genitourinary: Negative for decreased urine volume.   Musculoskeletal: Negative for back pain.   Neurological: Negative for weakness.   Psychiatric/Behavioral: Negative for depressed mood.         Vitals:    05/13/22 1118   BP: 104/76   Pulse: 74   Temp: 98 °F (36.7 °C)   TempSrc: Temporal   SpO2: 99%   Weight: 65.8 kg (145 lb)   Height: 162.6 cm (64\")       General Appearance:  Alert, cooperative, no distress, appears stated age   Head:  Normocephalic, without obvious abnormality, atraumatic   Eyes:  PERRL   Ears:  Normal TM's   Nose: Nares normal   Throat: Lips, mucosa, and tongue normal; teeth and gums normal   Neck: Supple   Back:  Symmetric   Lungs:  Clear to auscultation bilaterally, respirations unlabored   Chest wall:  No tenderness or deformity   Heart:  Regular rate and rhythm, S1 and S2 normal   Abdomen:  Soft, non-tender   Rectal:        Extremities: Extremities normal, atraumatic, no cyanosis or edema   Pulses: 2+ and symmetric all extremities   Skin: Skin color normal    Lymph nodes: Cervical nodes normal   Neurologic: Normal strength          Results for orders placed or performed in visit on 04/26/22   Lipid Panel    Specimen: Blood   Result Value Ref Range    Total Cholesterol 166 0 - 200 mg/dL    Triglycerides 75 0 - 150 mg/dL    HDL Cholesterol 65 (H) 40 - 60 mg/dL    LDL Cholesterol  87 0 - 100 mg/dL    VLDL Cholesterol 14 5 - 40 mg/dL    LDL/HDL Ratio 1.32    Comprehensive Metabolic Panel    Specimen: Blood   Result Value Ref Range    Glucose 70 65 - 99 mg/dL    BUN 24 (H) 6 - 20 mg/dL    Creatinine 0.91 0.57 - 1.00 mg/dL    Sodium 141 136 " - 145 mmol/L    Potassium 4.0 3.5 - 5.2 mmol/L    Chloride 104 98 - 107 mmol/L    CO2 27.2 22.0 - 29.0 mmol/L    Calcium 8.9 8.6 - 10.5 mg/dL    Total Protein 7.0 6.0 - 8.5 g/dL    Albumin 4.20 3.50 - 5.20 g/dL    ALT (SGPT) 23 1 - 33 U/L    AST (SGOT) 27 1 - 32 U/L    Alkaline Phosphatase 52 39 - 117 U/L    Total Bilirubin 0.2 0.0 - 1.2 mg/dL    Globulin 2.8 gm/dL    A/G Ratio 1.5 g/dL    BUN/Creatinine Ratio 26.4 (H) 7.0 - 25.0    Anion Gap 9.8 5.0 - 15.0 mmol/L    eGFR 80.4 >60.0 mL/min/1.73   CBC Auto Differential    Specimen: Blood   Result Value Ref Range    WBC 6.63 3.40 - 10.80 10*3/mm3    RBC 4.03 3.77 - 5.28 10*6/mm3    Hemoglobin 13.7 12.0 - 15.9 g/dL    Hematocrit 39.4 34.0 - 46.6 %    MCV 97.8 (H) 79.0 - 97.0 fL    MCH 34.0 (H) 26.6 - 33.0 pg    MCHC 34.8 31.5 - 35.7 g/dL    RDW 12.1 (L) 12.3 - 15.4 %    RDW-SD 44.2 37.0 - 54.0 fl    MPV 8.6 6.0 - 12.0 fL    Platelets 312 140 - 450 10*3/mm3    Neutrophil % 49.4 42.7 - 76.0 %    Lymphocyte % 38.0 19.6 - 45.3 %    Monocyte % 9.2 5.0 - 12.0 %    Eosinophil % 2.4 0.3 - 6.2 %    Basophil % 0.8 0.0 - 1.5 %    Immature Grans % 0.2 0.0 - 0.5 %    Neutrophils, Absolute 3.28 1.70 - 7.00 10*3/mm3    Lymphocytes, Absolute 2.52 0.70 - 3.10 10*3/mm3    Monocytes, Absolute 0.61 0.10 - 0.90 10*3/mm3    Eosinophils, Absolute 0.16 0.00 - 0.40 10*3/mm3    Basophils, Absolute 0.05 0.00 - 0.20 10*3/mm3    Immature Grans, Absolute 0.01 0.00 - 0.05 10*3/mm3    nRBC 0.0 0.0 - 0.2 /100 WBC     Assessment & Plan   Healthy female exam.    Diagnoses and all orders for this visit:    1. Annual physical exam (Primary)    2. Non-seasonal allergic rhinitis, unspecified trigger  -     fexofenadine-pseudoephedrine (Allegra-D Allergy & Congestion) 180-240 MG per 24 hr tablet; Take 1 tablet by mouth Daily.  Dispense: 30 tablet; Refill: 3      1. Annual exam     2. Patient Counseling:  --Nutrition: Stressed importance of moderation in sodium/caffeine intake, saturated fat and cholesterol.   Discussed caloric balance, sufficient intake of fresh fruits, vegetables, fiber, calcium, iron.  --Discussed the new recommendation against daily use of baby aspirin for primary prevention in low risk patients..    --Dental health: Discussed importance of regular tooth brushing, flossing, and dental visits.  -- suggested having eyes and vision checked if needed or past due.  --Immunizations reviewed.  --Discussed benefits of screening colonoscopy.    3. Discussed the patient's BMI with her.  The BMI is in the acceptable range  4. Follow up as needed for acute illness    Very active, doing well with diet, continue what she is currently doing  utd on screening measures    Oralia Shore MD  [facility]

## 2022-10-06 DIAGNOSIS — J30.89 NON-SEASONAL ALLERGIC RHINITIS, UNSPECIFIED TRIGGER: ICD-10-CM

## 2022-10-06 RX ORDER — FEXOFENADINE HYDROCHLORIDE AND PSEUDOEPHEDRINE HYDROCHLORIDE 180; 240 MG/1; MG/1
TABLET, FILM COATED, EXTENDED RELEASE ORAL
Qty: 30 TABLET | Refills: 0 | Status: SHIPPED | OUTPATIENT
Start: 2022-10-06 | End: 2022-11-10 | Stop reason: SDUPTHER

## 2022-11-04 ENCOUNTER — HOSPITAL ENCOUNTER (OUTPATIENT)
Dept: GENERAL RADIOLOGY | Facility: HOSPITAL | Age: 42
Discharge: HOME | End: 2022-11-04
Admitting: FAMILY MEDICINE

## 2022-11-04 ENCOUNTER — OFFICE VISIT (OUTPATIENT)
Dept: FAMILY MEDICINE CLINIC | Facility: CLINIC | Age: 42
End: 2022-11-04

## 2022-11-04 VITALS
WEIGHT: 145 LBS | OXYGEN SATURATION: 98 % | SYSTOLIC BLOOD PRESSURE: 138 MMHG | HEIGHT: 64 IN | BODY MASS INDEX: 24.75 KG/M2 | DIASTOLIC BLOOD PRESSURE: 86 MMHG | HEART RATE: 75 BPM | TEMPERATURE: 98 F

## 2022-11-04 DIAGNOSIS — M94.0 COSTOCHONDRITIS: Primary | ICD-10-CM

## 2022-11-04 DIAGNOSIS — M94.0 COSTOCHONDRITIS: ICD-10-CM

## 2022-11-04 PROCEDURE — 71046 X-RAY EXAM CHEST 2 VIEWS: CPT

## 2022-11-04 PROCEDURE — 99213 OFFICE O/P EST LOW 20 MIN: CPT | Performed by: FAMILY MEDICINE

## 2022-11-04 RX ORDER — PREDNISONE 20 MG/1
40 TABLET ORAL DAILY
Qty: 14 TABLET | Refills: 0 | Status: SHIPPED | OUTPATIENT
Start: 2022-11-04 | End: 2022-11-11

## 2022-11-04 NOTE — PROGRESS NOTES
Chief Complaint   Patient presents with   • Cough   • Flank Pain       Subjective   Valeria Carrasquillo is a 42 y.o. female.     History of Present Illness   Add on visit for flank pain after a cough    Had started having upper right side pain near rib area exacerbated by deep breath and cough. She had a URI the week prior. That is now resolving. Congestion symptoms better. No fevers or chills. No infectious type symptoms. Otherwise feels well. No hx renal stones. She does report when she gets a URI, this focal spot typically does get irritated      The following portions of the patient's history were reviewed and updated as appropriate: allergies, current medications, past family history, past medical history, past social history, past surgical history and problem list.      Past Medical History:   Diagnosis Date   • Allergic    • Anxiety    • Depression        Past Surgical History:   Procedure Laterality Date   • BREAST SURGERY  2008       Family History   Problem Relation Age of Onset   • Arthritis Mother    • Hypertension Mother    • Diabetes Maternal Aunt        Social History     Socioeconomic History   • Marital status:    Tobacco Use   • Smoking status: Former     Packs/day: 0.00     Years: 3.00     Pack years: 0.00     Types: Cigarettes     Start date: 1/1/2020   • Smokeless tobacco: Former   • Tobacco comments:     I smoke every once in a while   Substance and Sexual Activity   • Alcohol use: Yes     Alcohol/week: 1.0 standard drink     Types: 1 Glasses of wine per week     Comment: every once in awhile I will drink   • Drug use: Never   • Sexual activity: Yes     Partners: Male     Birth control/protection: None, Vasectomy       Current Outpatient Medications on File Prior to Visit   Medication Sig Dispense Refill   • Allegra-D Allergy & Congestion 180-240 MG per 24 hr tablet TAKE 1 TABLET BY MOUTH ONE TIME DAILY 30 tablet 0   • Rexulti 2 MG tablet      • vilazodone (Viibryd) 20 MG tablet tablet Take 1  tablet by mouth Daily. 90 tablet 3     No current facility-administered medications on file prior to visit.       Review of Systems   Constitutional: Negative for fever.   HENT: Negative for congestion.    Respiratory: Negative for cough and wheezing.    Cardiovascular: Negative for chest pain.   Gastrointestinal: Negative for abdominal pain.   Neurological: Negative for weakness.   Psychiatric/Behavioral: Negative for decreased concentration.           Vitals:    11/04/22 1522   BP: 138/86   Pulse: 75   Temp: 98 °F (36.7 °C)   SpO2: 98%      Objective   Physical Exam  Vitals reviewed.   Cardiovascular:      Rate and Rhythm: Normal rate and regular rhythm.      Pulses: Normal pulses.   Pulmonary:      Effort: Pulmonary effort is normal.      Breath sounds: Normal breath sounds. No wheezing.   Abdominal:      General: Abdomen is flat.   Neurological:      Mental Status: She is alert.   Psychiatric:         Mood and Affect: Mood normal.           Assessment & Plan   Problems Addressed this Visit    None  Visit Diagnoses     Costochondritis    -  Primary    Relevant Medications    predniSONE (DELTASONE) 20 MG tablet    Other Relevant Orders    XR Chest 2 View      Diagnoses       Codes Comments    Costochondritis    -  Primary ICD-10-CM: M94.0  ICD-9-CM: 733.6         Would xray as this has happened before with prior URI in that area. Exam benign  Trial anti-inflamm     Oralia Shore MD

## 2022-11-10 DIAGNOSIS — M94.0 COSTOCHONDRITIS: ICD-10-CM

## 2022-11-10 DIAGNOSIS — J30.89 NON-SEASONAL ALLERGIC RHINITIS, UNSPECIFIED TRIGGER: ICD-10-CM

## 2022-11-10 RX ORDER — FEXOFENADINE HCL AND PSEUDOEPHEDRINE HCI 180; 240 MG/1; MG/1
1 TABLET, EXTENDED RELEASE ORAL DAILY
Qty: 90 TABLET | Refills: 0 | Status: SHIPPED | OUTPATIENT
Start: 2022-11-10 | End: 2023-03-17

## 2022-11-10 RX ORDER — PREDNISONE 20 MG/1
40 TABLET ORAL DAILY
Qty: 14 TABLET | Refills: 0 | OUTPATIENT
Start: 2022-11-10 | End: 2022-11-17

## 2022-11-18 ENCOUNTER — OFFICE VISIT (OUTPATIENT)
Dept: FAMILY MEDICINE CLINIC | Facility: CLINIC | Age: 42
End: 2022-11-18

## 2022-11-18 VITALS
OXYGEN SATURATION: 96 % | TEMPERATURE: 97.5 F | HEIGHT: 64 IN | DIASTOLIC BLOOD PRESSURE: 76 MMHG | HEART RATE: 70 BPM | WEIGHT: 152 LBS | BODY MASS INDEX: 25.95 KG/M2 | SYSTOLIC BLOOD PRESSURE: 128 MMHG

## 2022-11-18 DIAGNOSIS — R07.81 RIB PAIN: Primary | ICD-10-CM

## 2022-11-18 PROCEDURE — 99213 OFFICE O/P EST LOW 20 MIN: CPT | Performed by: FAMILY MEDICINE

## 2022-11-18 RX ORDER — NAPROXEN 375 MG/1
375 TABLET, DELAYED RELEASE ORAL 2 TIMES DAILY WITH MEALS
Qty: 20 TABLET | Refills: 1 | Status: SHIPPED | OUTPATIENT
Start: 2022-11-18 | End: 2022-11-28

## 2022-11-18 NOTE — PROGRESS NOTES
Chief Complaint   Patient presents with   • Rib Pain       Subjective   Valeria Carrasquillo is a 42 y.o. female.     History of Present Illness   Here for follow-up.  She had a URI and then developed pain at the right rib area with coughing.    She is non-smoker.  No shortness of breath.  No associated chest pain or palpitations.  Able to maintain normal activity level.  But some discomfort with working out/lifting.  Anti-inflammatories helped but when she stopped it did return to an extent.    No fevers or chills.  No return of URI symptoms.  No risk factors for blood clots.  Otherwise well at this time and vital stable    The following portions of the patient's history were reviewed and updated as appropriate: allergies, current medications, past family history, past medical history, past social history, past surgical history and problem list.      Past Medical History:   Diagnosis Date   • Allergic    • Anxiety    • Depression        Past Surgical History:   Procedure Laterality Date   • BREAST SURGERY  2008       Family History   Problem Relation Age of Onset   • Arthritis Mother    • Hypertension Mother    • Diabetes Maternal Aunt        Social History     Socioeconomic History   • Marital status:    Tobacco Use   • Smoking status: Former     Packs/day: 0.00     Years: 3.00     Pack years: 0.00     Types: Cigarettes     Start date: 1/1/2020   • Smokeless tobacco: Former   • Tobacco comments:     I smoke every once in a while   Substance and Sexual Activity   • Alcohol use: Yes     Alcohol/week: 1.0 standard drink     Types: 1 Glasses of wine per week     Comment: every once in awhile I will drink   • Drug use: Never   • Sexual activity: Yes     Partners: Male     Birth control/protection: None, Vasectomy       Current Outpatient Medications on File Prior to Visit   Medication Sig Dispense Refill   • fexofenadine-pseudoephedrine (Allegra-D Allergy & Congestion) 180-240 MG per 24 hr tablet Take 1 tablet by  mouth Daily. 90 tablet 0   • Rexulti 2 MG tablet      • vilazodone (Viibryd) 20 MG tablet tablet Take 1 tablet by mouth Daily. 90 tablet 3     No current facility-administered medications on file prior to visit.       Review of Systems   Constitutional: Negative for fever.   HENT: Negative for congestion.    Respiratory: Negative for cough and chest tightness.    Cardiovascular: Negative for chest pain.   Gastrointestinal: Negative for abdominal pain.   Neurological: Negative for weakness.           Vitals:    11/18/22 0806   BP: 128/76   Pulse: 70   Temp: 97.5 °F (36.4 °C)   SpO2: 96%      Objective   Physical Exam  Vitals reviewed.   Cardiovascular:      Rate and Rhythm: Normal rate and regular rhythm.      Pulses: Normal pulses.   Pulmonary:      Effort: Pulmonary effort is normal.      Breath sounds: No wheezing.   Abdominal:      General: Abdomen is flat.   Skin:     Capillary Refill: Capillary refill takes less than 2 seconds.   Neurological:      General: No focal deficit present.      Mental Status: She is alert.   Psychiatric:         Mood and Affect: Mood normal.       Focal area of discomfort R upper rib area with cough     Assessment & Plan   Problems Addressed this Visit    None  Visit Diagnoses     Rib pain    -  Primary    Relevant Medications    Diclofenac Sodium (VOLTAREN) 1 % gel gel    naproxen (EC NAPROSYN) 375 MG tablet delayed-release EC tablet      Diagnoses       Codes Comments    Rib pain    -  Primary ICD-10-CM: R07.81  ICD-9-CM: 786.50         -Did improve with use of anti-inflammatory. Chest Xray was clear.   -Would continue with conservative care for now but if symptoms persist then consider further work up in approx 3 weeks         Oralia Shore MD

## 2023-03-17 ENCOUNTER — OFFICE VISIT (OUTPATIENT)
Dept: FAMILY MEDICINE CLINIC | Facility: CLINIC | Age: 43
End: 2023-03-17
Payer: COMMERCIAL

## 2023-03-17 VITALS
SYSTOLIC BLOOD PRESSURE: 102 MMHG | WEIGHT: 148 LBS | OXYGEN SATURATION: 100 % | HEART RATE: 75 BPM | DIASTOLIC BLOOD PRESSURE: 70 MMHG | BODY MASS INDEX: 25.27 KG/M2 | HEIGHT: 64 IN | TEMPERATURE: 98.2 F

## 2023-03-17 DIAGNOSIS — Z76.89 ENCOUNTER TO ESTABLISH CARE: Primary | ICD-10-CM

## 2023-03-17 DIAGNOSIS — F33.0 MILD EPISODE OF RECURRENT MAJOR DEPRESSIVE DISORDER: ICD-10-CM

## 2023-03-17 DIAGNOSIS — J30.89 NON-SEASONAL ALLERGIC RHINITIS, UNSPECIFIED TRIGGER: ICD-10-CM

## 2023-03-17 DIAGNOSIS — F41.1 GAD (GENERALIZED ANXIETY DISORDER): ICD-10-CM

## 2023-03-17 DIAGNOSIS — R41.840 IMPAIRED CONCENTRATION: ICD-10-CM

## 2023-03-17 DIAGNOSIS — Z00.00 HEALTHCARE MAINTENANCE: ICD-10-CM

## 2023-03-17 PROCEDURE — 99214 OFFICE O/P EST MOD 30 MIN: CPT | Performed by: NURSE PRACTITIONER

## 2023-03-17 RX ORDER — FEXOFENADINE HCL AND PSEUDOEPHEDRINE HCI 180; 240 MG/1; MG/1
1 TABLET, EXTENDED RELEASE ORAL DAILY
Qty: 90 TABLET | Refills: 3 | Status: SHIPPED | OUTPATIENT
Start: 2023-03-17

## 2023-03-17 RX ORDER — ACETAMINOPHEN AND CODEINE PHOSPHATE 300; 30 MG/1; MG/1
TABLET ORAL
COMMUNITY
Start: 2023-03-16

## 2023-03-17 NOTE — PROGRESS NOTES
Answers for HPI/ROS submitted by the patient on 3/10/2023  Please describe your symptoms.: Just need to establish a dr as Dr. Shore is not there anymore  Have you had these symptoms before?: No  How long have you been having these symptoms?: 1-4 days  What is the primary reason for your visit?: Other    Patient ID: Valeria Carrasquillo is a 42 y.o. female     Patient Care Team:  Head, ANISA Caldera as PCP - General (Nurse Practitioner)    Subjective     Chief Complaint   Patient presents with   • Anxiety   • Depression     Would like to discuss switching care to you and you taking over her meds   • ADHD     Would like to discuss starting something       History of Present Illness    Valeria Carrasquillo was a patient of Dr. Shore's who is no longer with our practice.  I will be taking over this patient's primary care.  This is the first time patient is seeing me.  Last physical completed 5/13/2023.      She presents to [facility] today for continued management of anxiety and depression.  She also wants to discuss ADHD medication.  No previous diagnosis of ADHD.    Wanting to switch off of Viibryd. Has been taking since 2016. Started Rexulti over a year.  Feels emotional. Cries during inappropriate times.    Onset a couple of months ago.    ADHD:  Problems at work with focusing and completing tasks in a timely manner.  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    [facility] by [facility].  Has upcoming appointment next Thursday.    She also takes Allegra-D for management of allergic rhinitis.  Requesting refill.    She denies any complaints of fever, chills, cough, chest pain, shortness of air, abdominal pain, nausea, or any other concerns.     The following portions of the patient's history were reviewed and updated as appropriate: allergies, current medications, past family history, past medical history, past social history, past surgical history and problem list.        ROS    Vitals:    03/17/23 0824   BP: 102/70   Pulse: 75   Temp: 98.2 °F (36.8 °C)   SpO2: 100%       Documented weights    03/17/23 0824   Weight: 67.1 kg (148 lb)     Body mass index is 25.39 kg/m².    Results for orders placed or performed in visit on 04/26/22   Lipid Panel    Specimen: Blood   Result Value Ref Range    Total Cholesterol 166 0 - 200 mg/dL    Triglycerides 75 0 - 150 mg/dL    HDL Cholesterol 65 (H) 40 - 60 mg/dL    LDL Cholesterol  87 0 - 100 mg/dL    VLDL Cholesterol 14 5 - 40 mg/dL    LDL/HDL Ratio 1.32    Comprehensive Metabolic Panel    Specimen: Blood   Result Value Ref Range    Glucose 70 65 - 99 mg/dL    BUN 24 (H) 6 - 20 mg/dL    Creatinine 0.91 0.57 - 1.00 mg/dL    Sodium 141 136 - 145 mmol/L    Potassium 4.0 3.5 - 5.2 mmol/L    Chloride 104 98 - 107 mmol/L    CO2 27.2 22.0 - 29.0 mmol/L    Calcium 8.9 8.6 - 10.5 mg/dL    Total Protein 7.0 6.0 - 8.5 g/dL    Albumin 4.20 3.50 - 5.20 g/dL    ALT (SGPT) 23 1 - 33 U/L    AST (SGOT) 27 1 - 32 U/L    Alkaline Phosphatase 52 39 - 117 U/L    Total Bilirubin 0.2 0.0 - 1.2 mg/dL    Globulin 2.8 gm/dL    A/G Ratio 1.5 g/dL    BUN/Creatinine Ratio 26.4 (H) 7.0 - 25.0    Anion Gap 9.8 5.0 - 15.0 mmol/L    eGFR 80.4 >60.0 mL/min/1.73   CBC Auto Differential    Specimen: Blood   Result Value Ref Range    WBC 6.63 3.40 - 10.80 10*3/mm3    RBC 4.03 3.77 - 5.28 10*6/mm3    Hemoglobin 13.7 12.0 - 15.9 g/dL    Hematocrit 39.4 34.0 - 46.6 %    MCV 97.8 (H) 79.0 - 97.0 fL    MCH 34.0 (H) 26.6 - 33.0 pg    MCHC 34.8 31.5 - 35.7 g/dL    RDW 12.1 (L) 12.3 - 15.4 %    RDW-SD 44.2 37.0 - 54.0 fl    MPV 8.6 6.0 - 12.0 fL    Platelets 312 140 - 450 10*3/mm3    Neutrophil % 49.4 42.7 - 76.0 %    Lymphocyte % 38.0 19.6 - 45.3 %    Monocyte % 9.2 5.0 - 12.0 %    Eosinophil % 2.4 0.3 - 6.2 %    Basophil % 0.8 0.0 - 1.5 %    Immature Grans % 0.2 0.0 - 0.5 %    Neutrophils, Absolute 3.28 1.70 - 7.00 10*3/mm3    Lymphocytes, Absolute 2.52 0.70 - 3.10 10*3/mm3     Monocytes, Absolute 0.61 0.10 - 0.90 10*3/mm3    Eosinophils, Absolute 0.16 0.00 - 0.40 10*3/mm3    Basophils, Absolute 0.05 0.00 - 0.20 10*3/mm3    Immature Grans, Absolute 0.01 0.00 - 0.05 10*3/mm3    nRBC 0.0 0.0 - 0.2 /100 WBC           Objective     Physical Exam  Vitals reviewed.   Constitutional:       General: She is not in acute distress.  Cardiovascular:      Rate and Rhythm: Normal rate and regular rhythm.      Heart sounds: No murmur heard.  Pulmonary:      Effort: Pulmonary effort is normal.      Breath sounds: Normal breath sounds. No wheezing.   Neurological:      Mental Status: She is alert and oriented to person, place, and time.   Psychiatric:         Mood and Affect: Mood normal.         Behavior: Behavior normal.         Thought Content: Thought content normal.         Judgment: Judgment normal.            Assessment & Plan     Assessment/Plan     Diagnoses and all orders for this visit:    1. Encounter to establish care (Primary)    2. Non-seasonal allergic rhinitis, unspecified trigger  -     fexofenadine-pseudoephedrine (Allegra-D Allergy & Congestion) 180-240 MG per 24 hr tablet; Take 1 tablet by mouth Daily.  Dispense: 90 tablet; Refill: 3    3. Mild episode of recurrent major depressive disorder (HCC)    4. TERRENCE (generalized anxiety disorder)    5. Impaired concentration    6. Healthcare maintenance  -     CBC (No Diff); Future  -     Lipid Panel With / Chol / HDL Ratio; Future  -     Comprehensive Metabolic Panel; Future  -     TSH Rfx On Abnormal To Free T4; Future  -     UA / M With / Rflx Culture([facility] ONLY) - Urine, Clean Catch; Future          Summary:  Valeria Carrasquillo presented to office today to establish care with new provider.  Requesting refill on her Allegra-D which she usually takes on a daily basis which controls her allergies well.  Has had no adverse effects from medication.  She also has upcoming appointment with psychiatry.  Advised to continue with appointment as  scheduled especially due to no prior history of ADHD.  Advise she will need tested for this to assure she has ADHD.  We will be able to take over medication management at that time.  Continue Viibryd and result he as directed.  Follow-up in office in about 6 months for annual physical.  She plans to have fasting labs completed at the hospital the week before.    In the meantime, instructed to contact us sooner for any problems or concerns.    Follow Up:  Return in about 6 months (around 9/17/2023) for Annual - labs at hospital week before.  .    Patient was given instructions and counseling regarding condition or for health maintenance advice.  Please see specific information pulled into the AVS if appropriate.      Patient was wearing facemask when I entered the room and throughout our encounter. Protective equipment was worn throughout this patient encounter including a face mask.  Hand hygiene was performed before donning protective equipment and after removal when leaving the room.     Cassi Giraldo, APRN  Family Medicine  [facility]  03/17/23  17:17 EDT

## 2023-04-06 ENCOUNTER — APPOINTMENT (OUTPATIENT)
Dept: WOMENS IMAGING | Facility: HOSPITAL | Age: 43
End: 2023-04-06
Payer: COMMERCIAL

## 2023-04-06 PROCEDURE — 76642 ULTRASOUND BREAST LIMITED: CPT | Performed by: RADIOLOGY

## 2023-09-14 ENCOUNTER — TELEPHONE (OUTPATIENT)
Dept: FAMILY MEDICINE CLINIC | Facility: CLINIC | Age: 43
End: 2023-09-14
Payer: COMMERCIAL

## 2023-09-14 NOTE — TELEPHONE ENCOUNTER
Caller: Valeria Carrasquillo    Relationship: Self    Best call back number: [phone] (Mobile)     What orders are you requesting (i.e. lab or imaging): ORDER FOR LAB WORK TO BE DONE AT [facility] PRIOR TO PHYSICAL APPT ON 09/22/2023    In what timeframe would the patient need to come in: ASAP, PATIENT IS GOING TO GO TO [facility] ON 09/16/2023    Where will you receive your lab/imaging services: [facility]    Additional notes: PATIENT IS ASKING FOR THIS ORDER TO BE SENT ASAP TO [facility], PLEASE ADVISE PATIENT WHEN SENT ASAP

## 2023-09-17 ENCOUNTER — LAB (OUTPATIENT)
Dept: LAB | Facility: HOSPITAL | Age: 43
End: 2023-09-17
Payer: COMMERCIAL

## 2023-09-17 PROCEDURE — 80050 GENERAL HEALTH PANEL: CPT | Performed by: NURSE PRACTITIONER

## 2023-09-17 PROCEDURE — 80061 LIPID PANEL: CPT | Performed by: NURSE PRACTITIONER

## 2023-09-17 PROCEDURE — 84439 ASSAY OF FREE THYROXINE: CPT | Performed by: NURSE PRACTITIONER

## 2023-09-22 ENCOUNTER — OFFICE VISIT (OUTPATIENT)
Dept: FAMILY MEDICINE CLINIC | Facility: CLINIC | Age: 43
End: 2023-09-22
Payer: COMMERCIAL

## 2023-09-22 VITALS
BODY MASS INDEX: 24.92 KG/M2 | SYSTOLIC BLOOD PRESSURE: 100 MMHG | WEIGHT: 146 LBS | OXYGEN SATURATION: 99 % | HEIGHT: 64 IN | TEMPERATURE: 97.5 F | DIASTOLIC BLOOD PRESSURE: 72 MMHG | HEART RATE: 85 BPM

## 2023-09-22 DIAGNOSIS — E03.9 HYPOTHYROIDISM (ACQUIRED): ICD-10-CM

## 2023-09-22 DIAGNOSIS — Z23 IMMUNIZATION DUE: ICD-10-CM

## 2023-09-22 DIAGNOSIS — H61.23 BILATERAL IMPACTED CERUMEN: ICD-10-CM

## 2023-09-22 DIAGNOSIS — Z12.11 ENCOUNTER FOR SCREENING COLONOSCOPY: ICD-10-CM

## 2023-09-22 DIAGNOSIS — Z00.00 ANNUAL PHYSICAL EXAM: Primary | ICD-10-CM

## 2023-09-22 RX ORDER — LEVOTHYROXINE SODIUM 0.03 MG/1
25 TABLET ORAL
Qty: 30 TABLET | Refills: 1 | Status: SHIPPED | OUTPATIENT
Start: 2023-09-22

## 2023-09-22 RX ORDER — SERTRALINE HYDROCHLORIDE 100 MG/1
TABLET, FILM COATED ORAL
COMMUNITY
Start: 2023-09-15

## 2023-09-22 NOTE — PROGRESS NOTES
"Chief Complaint   Patient presents with    Annual Exam       Patient Care Team:  Head, ANISA Caldera as PCP - General (Nurse Practitioner)    Subjective   Valeria Carrasquillo is a 43 y.o. female and is here for a yearly physical exam. The patient reports problems -   .    More tired.  Works out daily however unable to lose weight. Drinks a gallon of water per day.    Also will feel lightheaded when bending over.  Especially with weight lifting.  Worsened over past couple of months.  Has allergies and takes Allegra D daily.     Do you take any herbs or supplements that were not prescribed by a doctor? no. If so, these will be added to active medication list.    Health Habits:  Dental Exam: Up to date - 4 times per year.   Eye Exam: Up to date.  Wears glasses at night.    Diet: Tries to eat healthy    Exercise: Daily   Current exercise activities include: Weight lifting and cardio  Pap:  1/8/2020 - Had recent at [facility].    Mammogram: 2/13/2020 - Had recent at [facility]    Dexa: N/A   Colonoscopy: Never    The following portions of the patient's history were reviewed and updated as appropriate: allergies, current medications, past family history, past medical history, past social history, past surgical history, and problem list.    Social and Family and Surgical History reviewed and updated today, see Rooming tab.    Health History, Preventive Measures and Vaccination flow sheets reviewed and updated today.    Patient's current medical chart in Epic; including previous office notes, imaging, labs, specialist's evaluation either in notes or in Media tab reviewed today.    Other pertinent medical information also reviewed thru Care Everywhere function is also reviewed today.    Review of Systems  Review of Systems  Vitals:    09/22/23 0824   BP: 100/72   Pulse: 85   Temp: 97.5 °F (36.4 °C)   SpO2: 99%   Weight: 66.2 kg (146 lb)   Height: 162.6 cm (64.02\")     Wt Readings from Last 3 Encounters:   09/22/23 66.2 kg (146 lb) "   03/17/23 67.1 kg (148 lb)   11/18/22 68.9 kg (152 lb)       General Appearance:  Alert, cooperative, no distress, appears stated age   Head:  Normocephalic, without obvious abnormality, atraumatic   Eyes:  PERRL, conjunctiva/corneas clear, EOM's intact.   Ears:  Bilateral cerumen impaction   Nose: Nares normal, septum midline, mucosa normal, no drainage or sinus tenderness   Throat: Lips, mucosa, and tongue normal; teeth and gums normal   Neck: Supple, symmetrical, trachea midline, no adenopathy;   thyroid: No enlargement/tenderness/nodules       Lungs:  Clear to auscultation bilaterally, respirations even and unlabored   Chest wall:  No tenderness or deformity   Heart:  Regular rate and rhythm, S1 and S2 normal, no murmur, rub or gallop   Abdomen:  Soft, non-tender, bowel sounds active all four quadrants,   no masses, no organomegaly           Extremities: Extremities normal, atraumatic, no cyanosis or edema   Pulses: 2+ and symmetric all extremities   Skin: Skin color, texture, turgor normal, no rashes or lesions   Lymph nodes: Cervical and supraclavicular nodes normal   Neurologic: CNII-XII intact. Normal strength.       Patient's (Body mass index is 25.05 kg/m².) indicates that they are overweight (BMI 25-29.9) with health related conditions that include none . Weight is unchanged. BMI is  only slightly above average at 25.05 . We discussed  Continuing to watch diet and exercise.  Also new diagnosis of hypothyroidism .     Ear Cerumen Removal    Date/Time: 9/22/2023 12:31 PM  Performed by: Cassi Giraldo APRN  Authorized by: Cassi Giraldo APRN   Consent: Verbal consent obtained.  Risks and benefits: risks, benefits and alternatives were discussed  Consent given by: patient  Patient understanding: patient states understanding of the procedure being performed  Patient consent: the patient's understanding of the procedure matches consent given  Patient identity confirmed: verbally with patient    Anesthesia:  Local  Anesthetic: none  Location details: left ear and right ear  Patient tolerance: patient tolerated the procedure well with no immediate complications  Comments: Moderate amount of cerumen irrigated from both ear canals.  TM normal bilaterally.      Procedure type: irrigation   Sedation:  Patient sedated: no          Results for orders placed or performed in visit on 09/17/23   CBC (No Diff)    Specimen: Blood   Result Value Ref Range    WBC 6.56 3.40 - 10.80 10*3/mm3    RBC 4.17 3.77 - 5.28 10*6/mm3    Hemoglobin 13.8 12.0 - 15.9 g/dL    Hematocrit 40.9 34.0 - 46.6 %    MCV 98.1 (H) 79.0 - 97.0 fL    MCH 33.1 (H) 26.6 - 33.0 pg    MCHC 33.7 31.5 - 35.7 g/dL    RDW 12.1 (L) 12.3 - 15.4 %    RDW-SD 44.8 37.0 - 54.0 fl    MPV 8.5 6.0 - 12.0 fL    Platelets 331 140 - 450 10*3/mm3   Lipid Panel With / Chol / HDL Ratio    Specimen: Blood   Result Value Ref Range    Total Cholesterol 155 0 - 200 mg/dL    Triglycerides 137 0 - 150 mg/dL    HDL Cholesterol 52 40 - 60 mg/dL    LDL Cholesterol  79 0 - 100 mg/dL    VLDL Cholesterol 24 5 - 40 mg/dL    Chol/HDL Ratio 2.98    Comprehensive Metabolic Panel    Specimen: Blood   Result Value Ref Range    Glucose 97 65 - 99 mg/dL    BUN 20 6 - 20 mg/dL    Creatinine 0.97 0.57 - 1.00 mg/dL    Sodium 141 136 - 145 mmol/L    Potassium 4.1 3.5 - 5.2 mmol/L    Chloride 103 98 - 107 mmol/L    CO2 28.4 22.0 - 29.0 mmol/L    Calcium 9.0 8.6 - 10.5 mg/dL    Total Protein 6.7 6.0 - 8.5 g/dL    Albumin 4.4 3.5 - 5.2 g/dL    ALT (SGPT) 20 1 - 33 U/L    AST (SGOT) 23 1 - 32 U/L    Alkaline Phosphatase 48 39 - 117 U/L    Total Bilirubin <0.2 0.0 - 1.2 mg/dL    Globulin 2.3 gm/dL    A/G Ratio 1.9 g/dL    BUN/Creatinine Ratio 20.6 7.0 - 25.0    Anion Gap 9.6 5.0 - 15.0 mmol/L    eGFR 74.0 >60.0 mL/min/1.73   TSH Rfx On Abnormal To Free T4    Specimen: Blood   Result Value Ref Range    TSH 5.300 (H) 0.450 - 4.500 uIU/mL   T4F    Specimen: Blood   Result Value Ref Range    Free T4 0.90 0.82 - 1.77 ng/dL        Assessment & Plan   Healthy female exam.  Diagnoses and all orders for this visit:    1. Annual physical exam (Primary)    2. Encounter for screening colonoscopy  -     Ambulatory Referral For Screening Colonoscopy    3. Hypothyroidism (acquired)  -     levothyroxine (SYNTHROID, LEVOTHROID) 25 MCG tablet; Take 1 tablet by mouth Every Morning.  Dispense: 30 tablet; Refill: 1  -     T3, Free; Future  -     T4, Free; Future  -     TSH; Future    4. Immunization due  -     Tdap Vaccine Greater Than or Equal To 6yo IM    5. Bilateral impacted cerumen  -     Ear Cerumen Removal      Valeria Carrasquillo has been doing well since last seen except for worsening fatigue.  Advised likely due to hypothyroidism.  Recommend starting levothyroxine 25 mcg daily.  Will repeat thyroid function tests in 6-8 weeks and call.  The dizziness may be related to ear wax build up.  Will see if condition improves.  All other labs stable.    2. Patient Counseling:  --Nutrition: Stressed importance of moderation in sodium/caffeine intake, saturated fat and cholesterol.  Discussed caloric balance, sufficient intake of fresh fruits, vegetables, fiber,    calcium, iron.  --Exercise: Stressed the importance of regular exercise.    --Dental health: Discussed importance of regular tooth brushing, flossing, and dental visits.  --Suggested having eyes and vision checked if needed or past due.  --Immunizations reviewed. Tdap today.    --Discussed benefits of screening colonoscopy.  3. Discussed the patient's BMI with her.  The BMI is in the acceptable range  4. Follow up next physical in 1 year    Patient was given instructions and counseling regarding condition or for health maintenance advice.  Please see specific information pulled into the AVS if appropriate.      Medications Discontinued During This Encounter   Medication Reason    vilazodone (Viibryd) 20 MG tablet tablet *Therapy completed    acetaminophen-codeine (TYLENOL #3) 300-30 MG per  tablet *Therapy completed          Cassi Giraldo, ANISA  Family Practice  [facility] [place]

## 2023-10-03 ENCOUNTER — TELEPHONE (OUTPATIENT)
Dept: GASTROENTEROLOGY | Facility: CLINIC | Age: 43
End: 2023-10-03
Payer: COMMERCIAL

## 2023-11-03 ENCOUNTER — LAB (OUTPATIENT)
Dept: LAB | Facility: HOSPITAL | Age: 43
End: 2023-11-03
Payer: COMMERCIAL

## 2023-11-03 PROCEDURE — 84481 FREE ASSAY (FT-3): CPT | Performed by: NURSE PRACTITIONER

## 2023-11-03 PROCEDURE — 84443 ASSAY THYROID STIM HORMONE: CPT | Performed by: NURSE PRACTITIONER

## 2023-11-03 PROCEDURE — 84439 ASSAY OF FREE THYROXINE: CPT | Performed by: NURSE PRACTITIONER

## 2023-11-16 DIAGNOSIS — Z12.11 ENCOUNTER FOR SCREENING FOR MALIGNANT NEOPLASM OF COLON: Primary | ICD-10-CM

## 2023-11-19 DIAGNOSIS — E03.9 HYPOTHYROIDISM (ACQUIRED): ICD-10-CM

## 2023-11-20 RX ORDER — LEVOTHYROXINE SODIUM 0.03 MG/1
25 TABLET ORAL
Qty: 30 TABLET | Refills: 0 | Status: SHIPPED | OUTPATIENT
Start: 2023-11-20

## 2023-11-27 PROBLEM — Z12.11 ENCOUNTER FOR SCREENING FOR MALIGNANT NEOPLASM OF COLON: Status: ACTIVE | Noted: 2023-11-16

## 2023-12-19 DIAGNOSIS — E03.9 HYPOTHYROIDISM (ACQUIRED): ICD-10-CM

## 2023-12-19 RX ORDER — LEVOTHYROXINE SODIUM 0.03 MG/1
25 TABLET ORAL
Qty: 30 TABLET | Refills: 0 | Status: SHIPPED | OUTPATIENT
Start: 2023-12-19

## 2024-01-14 DIAGNOSIS — E03.9 HYPOTHYROIDISM (ACQUIRED): ICD-10-CM

## 2024-01-15 RX ORDER — LEVOTHYROXINE SODIUM 0.03 MG/1
25 TABLET ORAL
Qty: 30 TABLET | Refills: 0 | Status: SHIPPED | OUTPATIENT
Start: 2024-01-15

## 2024-01-23 ENCOUNTER — TELEPHONE (OUTPATIENT)
Dept: GASTROENTEROLOGY | Facility: CLINIC | Age: 44
End: 2024-01-23
Payer: COMMERCIAL

## 2024-01-23 NOTE — TELEPHONE ENCOUNTER
Called to see if I need to hold LEVOTHYROXINE (SYNTHROID) 25 MG and ADDERALL to hold prior to procedure.    Explained continue taking LEVOTHYROXINE.  Hold ADDERALL 24 hours prior to procedure.  She understands.

## 2024-01-31 ENCOUNTER — ANESTHESIA EVENT (OUTPATIENT)
Dept: PERIOP | Facility: HOSPITAL | Age: 44
End: 2024-01-31
Payer: COMMERCIAL

## 2024-01-31 RX ORDER — SODIUM PHOSPHATE,MONO-DIBASIC 19G-7G/118
1 ENEMA (ML) RECTAL
COMMUNITY

## 2024-01-31 RX ORDER — DEXTROAMPHETAMINE SACCHARATE, AMPHETAMINE ASPARTATE MONOHYDRATE, DEXTROAMPHETAMINE SULFATE AND AMPHETAMINE SULFATE 2.5; 2.5; 2.5; 2.5 MG/1; MG/1; MG/1; MG/1
20 CAPSULE, EXTENDED RELEASE ORAL EVERY MORNING
COMMUNITY

## 2024-01-31 RX ORDER — MULTIPLE VITAMINS W/ MINERALS TAB 9MG-400MCG
1 TAB ORAL DAILY
COMMUNITY

## 2024-01-31 RX ORDER — NAPROXEN SODIUM 220 MG
220 TABLET ORAL 2 TIMES DAILY PRN
COMMUNITY

## 2024-02-01 ENCOUNTER — ANESTHESIA (OUTPATIENT)
Dept: PERIOP | Facility: HOSPITAL | Age: 44
End: 2024-02-01
Payer: COMMERCIAL

## 2024-02-01 ENCOUNTER — HOSPITAL ENCOUNTER (OUTPATIENT)
Facility: HOSPITAL | Age: 44
Setting detail: HOSPITAL OUTPATIENT SURGERY
Discharge: HOME | End: 2024-02-01
Attending: STUDENT IN AN ORGANIZED HEALTH CARE EDUCATION/TRAINING PROGRAM | Admitting: STUDENT IN AN ORGANIZED HEALTH CARE EDUCATION/TRAINING PROGRAM
Payer: COMMERCIAL

## 2024-02-01 VITALS
OXYGEN SATURATION: 100 % | SYSTOLIC BLOOD PRESSURE: 110 MMHG | HEIGHT: 64 IN | RESPIRATION RATE: 16 BRPM | BODY MASS INDEX: 22.16 KG/M2 | DIASTOLIC BLOOD PRESSURE: 76 MMHG | TEMPERATURE: 97.9 F | WEIGHT: 129.8 LBS | HEART RATE: 69 BPM

## 2024-02-01 DIAGNOSIS — Z12.11 ENCOUNTER FOR SCREENING FOR MALIGNANT NEOPLASM OF COLON: ICD-10-CM

## 2024-02-01 PROCEDURE — 25810000003 LACTATED RINGERS PER 1000 ML: Performed by: NURSE ANESTHETIST, CERTIFIED REGISTERED

## 2024-02-01 PROCEDURE — 45378 DIAGNOSTIC COLONOSCOPY: CPT | Performed by: STUDENT IN AN ORGANIZED HEALTH CARE EDUCATION/TRAINING PROGRAM

## 2024-02-01 PROCEDURE — 25010000002 PROPOFOL 10 MG/ML EMULSION: Performed by: ANESTHESIOLOGY

## 2024-02-01 RX ORDER — MAGNESIUM HYDROXIDE 1200 MG/15ML
LIQUID ORAL AS NEEDED
Status: DISCONTINUED | OUTPATIENT
Start: 2024-02-01 | End: 2024-02-01 | Stop reason: HOSPADM

## 2024-02-01 RX ORDER — PROPOFOL 10 MG/ML
VIAL (ML) INTRAVENOUS AS NEEDED
Status: DISCONTINUED | OUTPATIENT
Start: 2024-02-01 | End: 2024-02-01 | Stop reason: SURG

## 2024-02-01 RX ORDER — SODIUM CHLORIDE, SODIUM LACTATE, POTASSIUM CHLORIDE, CALCIUM CHLORIDE 600; 310; 30; 20 MG/100ML; MG/100ML; MG/100ML; MG/100ML
9 INJECTION, SOLUTION INTRAVENOUS CONTINUOUS PRN
Status: DISCONTINUED | OUTPATIENT
Start: 2024-02-01 | End: 2024-02-01 | Stop reason: HOSPADM

## 2024-02-01 RX ORDER — SODIUM CHLORIDE 9 MG/ML
40 INJECTION, SOLUTION INTRAVENOUS AS NEEDED
Status: DISCONTINUED | OUTPATIENT
Start: 2024-02-01 | End: 2024-02-01 | Stop reason: HOSPADM

## 2024-02-01 RX ORDER — SIMETHICONE 80 MG
160 TABLET,CHEWABLE ORAL ONCE
Status: DISCONTINUED | OUTPATIENT
Start: 2024-02-01 | End: 2024-02-01 | Stop reason: HOSPADM

## 2024-02-01 RX ORDER — LIDOCAINE HYDROCHLORIDE 20 MG/ML
INJECTION, SOLUTION INFILTRATION; PERINEURAL AS NEEDED
Status: DISCONTINUED | OUTPATIENT
Start: 2024-02-01 | End: 2024-02-01 | Stop reason: SURG

## 2024-02-01 RX ORDER — SODIUM CHLORIDE 0.9 % (FLUSH) 0.9 %
10 SYRINGE (ML) INJECTION EVERY 12 HOURS SCHEDULED
Status: DISCONTINUED | OUTPATIENT
Start: 2024-02-01 | End: 2024-02-01 | Stop reason: HOSPADM

## 2024-02-01 RX ORDER — SODIUM CHLORIDE 0.9 % (FLUSH) 0.9 %
10 SYRINGE (ML) INJECTION AS NEEDED
Status: DISCONTINUED | OUTPATIENT
Start: 2024-02-01 | End: 2024-02-01 | Stop reason: HOSPADM

## 2024-02-01 RX ADMIN — SODIUM CHLORIDE, POTASSIUM CHLORIDE, SODIUM LACTATE AND CALCIUM CHLORIDE 9 ML/HR: 600; 310; 30; 20 INJECTION, SOLUTION INTRAVENOUS at 07:11

## 2024-02-01 RX ADMIN — PROPOFOL 380 MG: 10 INJECTION, EMULSION INTRAVENOUS at 07:44

## 2024-02-01 RX ADMIN — LIDOCAINE HYDROCHLORIDE 80 MG: 20 INJECTION, SOLUTION INFILTRATION; PERINEURAL at 07:44

## 2024-02-01 NOTE — H&P
Patient Care Team:  Head, ANISA Caldera as PCP - General (Nurse Practitioner)    CHIEF COMPLAINT: Screening CRC    HISTORY OF PRESENT ILLNESS:  For average risk screening (1st c/s)    Past Medical History:   Diagnosis Date    Allergic     Anxiety     Depression     Hypothyroidism      Past Surgical History:   Procedure Laterality Date    BREAST SURGERY  2008     Family History   Problem Relation Age of Onset    Arthritis Mother     Hypertension Mother     Diabetes Maternal Aunt      Social History     Tobacco Use    Smoking status: Former     Packs/day: 0.25     Years: 5.00     Additional pack years: 0.00     Total pack years: 1.25     Types: Cigarettes     Start date: 2020     Quit date:      Years since quittin.0    Smokeless tobacco: Never    Tobacco comments:     I smoke every once in a while   Vaping Use    Vaping Use: Former    Substances: Nicotine   Substance Use Topics    Alcohol use: Yes     Alcohol/week: 1.0 standard drink of alcohol     Types: 1 Glasses of wine per week     Comment: every once in awhile I will drink    Drug use: Yes     Types: Marijuana     Comment: occ     Medications Prior to Admission   Medication Sig Dispense Refill Last Dose    amphetamine-dextroamphetamine XR (ADDERALL XR) 10 MG 24 hr capsule Take 2 capsules by mouth Every Morning   2024    glucosamine-chondroitin 500-400 MG capsule capsule Take 1 capsule by mouth 3 (Three) Times a Day With Meals.   2024    levothyroxine (SYNTHROID, LEVOTHROID) 25 MCG tablet TAKE 1 TABLET BY MOUTH EVERY MORNING 30 tablet 0 2024    multivitamin with minerals tablet tablet Take 1 tablet by mouth Daily.   2024    fexofenadine-pseudoephedrine (Allegra-D Allergy & Congestion) 180-240 MG per 24 hr tablet Take 1 tablet by mouth Daily. 90 tablet 3 Unknown    naproxen sodium (ALEVE) 220 MG tablet Take 1 tablet by mouth 2 (Two) Times a Day As Needed.   Unknown     Allergies:  Penicillin g and Penicillins    REVIEW OF SYSTEMS:   "Please see the above history of present illness for pertinent positives and negatives.  The remainder of the patient's systems have been reviewed and are negative.     Vital Signs  Temp:  [97.7 °F (36.5 °C)] 97.7 °F (36.5 °C)  Heart Rate:  [65] 65  Resp:  [14] 14  BP: (97)/(69) 97/69    Flowsheet Rows      Flowsheet Row First Filed Value   Admission Height 161.3 cm (63.5\") Documented at 02/01/2024 0659   Admission Weight 58.9 kg (129 lb 12.8 oz) Documented at 02/01/2024 0649             Physical Exam:  Physical Exam   Constitutional: Patient appears well-developed and well-nourished and in no acute distress   HEENT:   Head: Normocephalic and atraumatic.   Eyes:  Pupils are equal, round, and reactive to light. EOM are intact. Sclerae are anicteric and non-injected.  Mouth and Throat: Patient has moist mucous membranes. Oropharynx is clear of any erythema or exudate.     Neck: Neck supple. No JVD present. No thyromegaly present. No lymphadenopathy present.  Cardiovascular: Regular rate, regular rhythm, S1 normal and S2 normal.  Exam reveals no gallop and no friction rub.  No murmur heard.  Pulmonary/Chest: Lungs are clear to auscultation bilaterally. No respiratory distress. No wheezes. No rhonchi. No rales.   Abdominal: Soft. Bowel sounds are normal. No distension and no mass. There is no hepatosplenomegaly. There is no tenderness.   Musculoskeletal: Normal Muscle tone  Extremities: No edema. Pulses are palpable in all 4 extremities.  Neurological: Patient is alert and oriented to person, place, and time. Cranial nerves II-XII are grossly intact with no focal deficits.  Skin: Skin is warm. No rash noted. Nails show no clubbing.  No cyanosis or erythema.    Debilities/Disabilities Identified: None  Emotional Behavior: Appropriate     Results Review:   I reviewed the patient's new clinical results.    Lab Results (most recent)       None            Imaging Results (Most Recent)       None          reviewed    ECG/EMG " Results (most recent)       None          reviewed    Assessment & Plan   Screening CRC/  colonoscopy      I discussed the patient's findings and my recommendations with patient.     Chu Theodore MD  02/01/24  07:42 EST    Time: 10 min prior to procedure.

## 2024-02-01 NOTE — ANESTHESIA PREPROCEDURE EVALUATION
Anesthesia Evaluation     Patient summary reviewed and Nursing notes reviewed   no history of anesthetic complications:   NPO Solid Status: > 8 hours  NPO Liquid Status: > 2 hours           Airway   Mallampati: I  TM distance: >3 FB  Neck ROM: full  No difficulty expected  Dental - normal exam     Pulmonary - normal exam    breath sounds clear to auscultation  (+) a smoker (quit one month ago, occ vapes) Former, Smoked day of surgery, vape,  (-) sleep apnea  Cardiovascular - negative cardio ROS and normal exam  Exercise tolerance: good (4-7 METS)    Rhythm: regular  Rate: normal        Neuro/Psych- negative ROS  GI/Hepatic/Renal/Endo    (+) thyroid problem hypothyroidism    Musculoskeletal (-) negative ROS    Abdominal  - normal exam   Substance History   (-) drug useAlcohol use: occ.     OB/GYN negative ob/gyn ROS         Other - negative ROS                   Anesthesia Plan    ASA 2     MAC     intravenous induction     Anesthetic plan, risks, benefits, and alternatives have been provided, discussed and informed consent has been obtained with: patient and spouse/significant other.    Use of blood products discussed with patient and spouse/significant other  Consented to blood products.      CODE STATUS:

## 2024-02-11 DIAGNOSIS — E03.9 HYPOTHYROIDISM (ACQUIRED): ICD-10-CM

## 2024-02-12 RX ORDER — LEVOTHYROXINE SODIUM 0.03 MG/1
25 TABLET ORAL
Qty: 30 TABLET | Refills: 5 | Status: SHIPPED | OUTPATIENT
Start: 2024-02-12

## 2024-08-07 DIAGNOSIS — E03.9 HYPOTHYROIDISM (ACQUIRED): ICD-10-CM

## 2024-08-07 RX ORDER — LEVOTHYROXINE SODIUM 0.03 MG/1
25 TABLET ORAL
Qty: 30 TABLET | Refills: 0 | Status: SHIPPED | OUTPATIENT
Start: 2024-08-07

## 2024-08-13 ENCOUNTER — TELEPHONE (OUTPATIENT)
Dept: FAMILY MEDICINE CLINIC | Facility: CLINIC | Age: 44
End: 2024-08-13
Payer: COMMERCIAL

## 2024-08-13 RX ORDER — NAPROXEN SODIUM 220 MG
220 TABLET ORAL 2 TIMES DAILY PRN
Qty: 30 TABLET | Refills: 0 | Status: SHIPPED | OUTPATIENT
Start: 2024-08-13

## 2024-08-13 NOTE — TELEPHONE ENCOUNTER
Caller: Valeria Carrasquillo    Relationship: Self    Best call back number: [phone]     Requested Prescriptions:   Requested Prescriptions     Pending Prescriptions Disp Refills    naproxen sodium (ALEVE) 220 MG tablet       Sig: Take 1 tablet by mouth 2 (Two) Times a Day As Needed.        Pharmacy where request should be sent: [id number] [address] [facility] [phone]  - [phone] FX     Last office visit with prescribing clinician: 9/22/2023   Last telemedicine visit with prescribing clinician: Visit date not found   Next office visit with prescribing clinician: 9/27/2024     Additional details provided by patient: PATIENT STATES THAT SHE IS OUT OF REFILLS ON THIS MEDICATION AND NEEDS A NEW PRESCRIPTION SENT TO HER PHARMACY.     PATIENT STATES SHE IS OUT OF THIS MEDICATION.     Does the patient have less than a 3 day supply:  [x] Yes  [] No    Would you like a call back once the refill request has been completed: [] Yes [x] No    If the office needs to give you a call back, can they leave a voicemail: [] Yes [x] No    Surya Godoy Rep   08/13/24 11:41 EDT

## 2024-09-06 DIAGNOSIS — E03.9 HYPOTHYROIDISM (ACQUIRED): ICD-10-CM

## 2024-09-06 RX ORDER — LEVOTHYROXINE SODIUM 25 UG/1
25 TABLET ORAL EVERY MORNING
Qty: 30 TABLET | Refills: 0 | Status: SHIPPED | OUTPATIENT
Start: 2024-09-06

## 2024-09-17 ENCOUNTER — TELEPHONE (OUTPATIENT)
Dept: FAMILY MEDICINE CLINIC | Facility: CLINIC | Age: 44
End: 2024-09-17

## 2024-09-17 DIAGNOSIS — Z00.00 ANNUAL PHYSICAL EXAM: Primary | ICD-10-CM

## 2024-09-17 DIAGNOSIS — E03.9 HYPOTHYROIDISM (ACQUIRED): ICD-10-CM

## 2024-09-17 NOTE — TELEPHONE ENCOUNTER
Caller: Valeria Carrasquillo    Relationship: Self    Best call back number: [phone]     What orders are you requesting (i.e. lab or imaging): ANNUAL BLOODWORK    In what timeframe would the patient need to come in: ASAP    Where will you receive your lab/imaging services[facility]    Additional notes: PLEASE SEND ORDERS TO [facility].

## 2024-09-21 ENCOUNTER — LAB (OUTPATIENT)
Dept: LAB | Facility: HOSPITAL | Age: 44
End: 2024-09-21
Payer: COMMERCIAL

## 2024-09-21 LAB
ALBUMIN SERPL-MCNC: 4.3 G/DL (ref 3.5–5.2)
ALBUMIN/GLOB SERPL: 1.7 G/DL
ALP SERPL-CCNC: 50 U/L (ref 39–117)
ALT SERPL W P-5'-P-CCNC: 13 U/L (ref 1–33)
ANION GAP SERPL CALCULATED.3IONS-SCNC: 9.1 MMOL/L (ref 5–15)
AST SERPL-CCNC: 20 U/L (ref 1–32)
BACTERIA UR QL AUTO: ABNORMAL /HPF
BILIRUB SERPL-MCNC: 0.3 MG/DL (ref 0–1.2)
BILIRUB UR QL STRIP: NEGATIVE
BUN SERPL-MCNC: 17 MG/DL (ref 6–20)
BUN/CREAT SERPL: 18.1 (ref 7–25)
CALCIUM SPEC-SCNC: 9.5 MG/DL (ref 8.6–10.5)
CHLORIDE SERPL-SCNC: 103 MMOL/L (ref 98–107)
CHOLEST SERPL-MCNC: 182 MG/DL (ref 0–200)
CLARITY UR: ABNORMAL
CO2 SERPL-SCNC: 27.9 MMOL/L (ref 22–29)
COLOR UR: YELLOW
CREAT SERPL-MCNC: 0.94 MG/DL (ref 0.57–1)
DEPRECATED RDW RBC AUTO: 45.1 FL (ref 37–54)
EGFRCR SERPLBLD CKD-EPI 2021: 76.4 ML/MIN/1.73
ERYTHROCYTE [DISTWIDTH] IN BLOOD BY AUTOMATED COUNT: 12.4 % (ref 12.3–15.4)
GLOBULIN UR ELPH-MCNC: 2.5 GM/DL
GLUCOSE SERPL-MCNC: 85 MG/DL (ref 65–99)
GLUCOSE UR STRIP-MCNC: NEGATIVE MG/DL
HCT VFR BLD AUTO: 40.6 % (ref 34–46.6)
HDLC SERPL QL: 2.68
HDLC SERPL-MCNC: 68 MG/DL (ref 40–60)
HGB BLD-MCNC: 13.9 G/DL (ref 12–15.9)
HGB UR QL STRIP.AUTO: NEGATIVE
HOLD SPECIMEN: NORMAL
HYALINE CASTS UR QL AUTO: ABNORMAL /LPF
KETONES UR QL STRIP: NEGATIVE
LDLC SERPL CALC-MCNC: 104 MG/DL (ref 0–100)
LEUKOCYTE ESTERASE UR QL STRIP.AUTO: ABNORMAL
MCH RBC QN AUTO: 33.7 PG (ref 26.6–33)
MCHC RBC AUTO-ENTMCNC: 34.2 G/DL (ref 31.5–35.7)
MCV RBC AUTO: 98.5 FL (ref 79–97)
NITRITE UR QL STRIP: NEGATIVE
PH UR STRIP.AUTO: 7.5 [PH] (ref 5–8)
PLATELET # BLD AUTO: 397 10*3/MM3 (ref 140–450)
PMV BLD AUTO: 8.3 FL (ref 6–12)
POTASSIUM SERPL-SCNC: 4.7 MMOL/L (ref 3.5–5.2)
PROT SERPL-MCNC: 6.8 G/DL (ref 6–8.5)
PROT UR QL STRIP: ABNORMAL
RBC # BLD AUTO: 4.12 10*6/MM3 (ref 3.77–5.28)
RBC # UR STRIP: ABNORMAL /HPF
REF LAB TEST METHOD: ABNORMAL
SODIUM SERPL-SCNC: 140 MMOL/L (ref 136–145)
SP GR UR STRIP: 1.02 (ref 1–1.03)
SQUAMOUS #/AREA URNS HPF: ABNORMAL /HPF
T4 FREE SERPL-MCNC: 1.21 NG/DL (ref 0.92–1.68)
TRIGL SERPL-MCNC: 49 MG/DL (ref 0–150)
TSH SERPL DL<=0.05 MIU/L-ACNC: 0.92 UIU/ML (ref 0.27–4.2)
UROBILINOGEN UR QL STRIP: ABNORMAL
VLDLC SERPL-MCNC: 10 MG/DL (ref 5–40)
WBC # UR STRIP: ABNORMAL /HPF
WBC NRBC COR # BLD AUTO: 5.11 10*3/MM3 (ref 3.4–10.8)

## 2024-09-21 PROCEDURE — 84439 ASSAY OF FREE THYROXINE: CPT | Performed by: NURSE PRACTITIONER

## 2024-09-21 PROCEDURE — 36415 COLL VENOUS BLD VENIPUNCTURE: CPT | Performed by: NURSE PRACTITIONER

## 2024-09-21 PROCEDURE — 80050 GENERAL HEALTH PANEL: CPT | Performed by: NURSE PRACTITIONER

## 2024-09-21 PROCEDURE — 80061 LIPID PANEL: CPT | Performed by: NURSE PRACTITIONER

## 2024-09-21 PROCEDURE — 81001 URINALYSIS AUTO W/SCOPE: CPT | Performed by: NURSE PRACTITIONER

## 2024-09-21 PROCEDURE — 87086 URINE CULTURE/COLONY COUNT: CPT | Performed by: NURSE PRACTITIONER

## 2024-09-22 LAB — BACTERIA SPEC AEROBE CULT: NO GROWTH

## 2024-09-27 ENCOUNTER — OFFICE VISIT (OUTPATIENT)
Dept: FAMILY MEDICINE CLINIC | Facility: CLINIC | Age: 44
End: 2024-09-27
Payer: COMMERCIAL

## 2024-09-27 VITALS
HEIGHT: 64 IN | WEIGHT: 127.7 LBS | OXYGEN SATURATION: 98 % | DIASTOLIC BLOOD PRESSURE: 80 MMHG | HEART RATE: 105 BPM | SYSTOLIC BLOOD PRESSURE: 120 MMHG | BODY MASS INDEX: 21.8 KG/M2 | TEMPERATURE: 98 F

## 2024-09-27 DIAGNOSIS — Z11.59 ENCOUNTER FOR HEPATITIS C SCREENING TEST FOR LOW RISK PATIENT: ICD-10-CM

## 2024-09-27 DIAGNOSIS — D50.9 IRON DEFICIENCY ANEMIA, UNSPECIFIED IRON DEFICIENCY ANEMIA TYPE: ICD-10-CM

## 2024-09-27 DIAGNOSIS — E03.9 HYPOTHYROIDISM (ACQUIRED): ICD-10-CM

## 2024-09-27 DIAGNOSIS — Z00.00 ANNUAL PHYSICAL EXAM: Primary | ICD-10-CM

## 2024-09-27 PROCEDURE — 99396 PREV VISIT EST AGE 40-64: CPT | Performed by: NURSE PRACTITIONER

## 2024-09-27 RX ORDER — HYDROCORTISONE 2.5 %
1 CREAM (GRAM) TOPICAL AS NEEDED
COMMUNITY
Start: 2024-06-10

## 2024-09-27 RX ORDER — LISDEXAMFETAMINE DIMESYLATE 30 MG/1
30 CAPSULE ORAL EVERY MORNING
COMMUNITY
Start: 2024-09-11

## 2024-09-27 RX ORDER — SPIRONOLACTONE 50 MG/1
50 TABLET, FILM COATED ORAL DAILY
COMMUNITY
Start: 2024-09-05

## 2024-10-02 DIAGNOSIS — E03.9 HYPOTHYROIDISM (ACQUIRED): ICD-10-CM

## 2024-10-02 RX ORDER — LEVOTHYROXINE SODIUM 25 UG/1
25 TABLET ORAL EVERY MORNING
Qty: 30 TABLET | Refills: 0 | Status: SHIPPED | OUTPATIENT
Start: 2024-10-02

## 2024-10-02 RX ORDER — LEVOTHYROXINE SODIUM 25 UG/1
25 TABLET ORAL EVERY MORNING
Qty: 30 TABLET | Refills: 0 | OUTPATIENT
Start: 2024-10-02

## 2024-10-14 DIAGNOSIS — J30.89 NON-SEASONAL ALLERGIC RHINITIS, UNSPECIFIED TRIGGER: ICD-10-CM

## 2024-10-14 RX ORDER — FEXOFENADINE HCL AND PSEUDOEPHEDRINE HCL 180; 240 MG/1; MG/1
1 TABLET, EXTENDED RELEASE ORAL DAILY
Qty: 90 TABLET | Refills: 3 | Status: SHIPPED | OUTPATIENT
Start: 2024-10-14

## 2024-10-14 NOTE — TELEPHONE ENCOUNTER
Caller: Valeria Carrasquillo    Relationship: Self    Best call back number: [phone]    Requested Prescriptions:   Requested Prescriptions     Pending Prescriptions Disp Refills    fexofenadine-pseudoephedrine (Allegra-D Allergy & Congestion) 180-240 MG per 24 hr tablet 90 tablet 3     Sig: Take 1 tablet by mouth Daily.        Pharmacy where request should be sent: [facility] [place], [place] - [address] [facility] [phone] [facility] [phone] FX     Last office visit with prescribing clinician: 9/27/2024   Last telemedicine visit with prescribing clinician: Visit date not found   Next office visit with prescribing clinician: 10/10/2025     Additional details provided by patient:     Does the patient have less than a 3 day supply:  [x] Yes  [] No    Would you like a call back once the refill request has been completed: [] Yes [] No    If the office needs to give you a call back, can they leave a voicemail: [] Yes [] No    Surya Christiansen Rep   10/14/24 08:30 EDT

## 2024-10-29 DIAGNOSIS — E03.9 HYPOTHYROIDISM (ACQUIRED): ICD-10-CM

## 2024-10-29 RX ORDER — LEVOTHYROXINE SODIUM 25 UG/1
25 TABLET ORAL EVERY MORNING
Qty: 30 TABLET | Refills: 0 | Status: SHIPPED | OUTPATIENT
Start: 2024-10-29

## 2024-11-26 DIAGNOSIS — E03.9 HYPOTHYROIDISM (ACQUIRED): ICD-10-CM

## 2024-11-26 RX ORDER — LEVOTHYROXINE SODIUM 25 UG/1
25 TABLET ORAL EVERY MORNING
Qty: 30 TABLET | Refills: 0 | Status: SHIPPED | OUTPATIENT
Start: 2024-11-26

## 2024-12-27 DIAGNOSIS — E03.9 HYPOTHYROIDISM (ACQUIRED): ICD-10-CM

## 2024-12-27 RX ORDER — LEVOTHYROXINE SODIUM 25 UG/1
25 TABLET ORAL EVERY MORNING
Qty: 30 TABLET | Refills: 0 | Status: SHIPPED | OUTPATIENT
Start: 2024-12-27

## 2025-02-17 DIAGNOSIS — E03.9 HYPOTHYROIDISM (ACQUIRED): ICD-10-CM

## 2025-02-17 RX ORDER — LEVOTHYROXINE SODIUM 25 UG/1
25 TABLET ORAL EVERY MORNING
Qty: 30 TABLET | Refills: 0 | Status: SHIPPED | OUTPATIENT
Start: 2025-02-17

## 2025-02-17 NOTE — TELEPHONE ENCOUNTER
Caller: Valeria Carrasquillo    Relationship: Self    Best call back number:      Requested Prescriptions:      levothyroxine (SYNTHROID, LEVOTHROID) 25 MCG tablet     Pharmacy where request should be sent: [address] - [phone] PH - [phone] FX     Last office visit with prescribing clinician: 9/27/2024   Last telemedicine visit with prescribing clinician: Visit date not found   Next office visit with prescribing clinician: 10/10/2025     Additional details provided by patient: PATIENT IS NEEDING REFILL IS OUT OF MEDICATION     PATIENT IS NEEDING A NEW PRESCRIPTION DUE TO MOVE IN PHARMACY         Would you like a call back once the refill request has been completed: [] Yes [x] No    If the office needs to give you a call back, can they leave a voicemail: [] Yes [x] No    Surya Yates Rep   02/17/25 09:37 EST

## 2025-03-13 DIAGNOSIS — J30.89 NON-SEASONAL ALLERGIC RHINITIS, UNSPECIFIED TRIGGER: ICD-10-CM

## 2025-03-13 RX ORDER — FEXOFENADINE HCL AND PSEUDOEPHEDRINE HCL 180; 240 MG/1; MG/1
1 TABLET, EXTENDED RELEASE ORAL DAILY
Qty: 90 TABLET | Refills: 2 | Status: SHIPPED | OUTPATIENT
Start: 2025-03-13

## 2025-03-13 NOTE — TELEPHONE ENCOUNTER
Caller: Neida Valeria RENALDO    Relationship: Self    Best call back number: [phone]    Requested Prescriptions:   Requested Prescriptions     Pending Prescriptions Disp Refills    fexofenadine-pseudoephedrine (Allegra-D Allergy & Congestion) 180-240 MG per 24 hr tablet 90 tablet 3     Sig: Take 1 tablet by mouth Daily.        Pharmacy where request should be sent: [facility] [address] [place] [address] [address] - [phone]  - [phone] FX     Last office visit with prescribing clinician: 9/27/2024   Last telemedicine visit with prescribing clinician: Visit date not found   Next office visit with prescribing clinician: 10/10/2025     Additional details provided by patient: OUT OF MEDICATION    Does the patient have less than a 3 day supply:  [x] Yes  [] No    Surya Boston Rep   03/13/25 12:11 EDT

## 2025-03-14 DIAGNOSIS — E03.9 HYPOTHYROIDISM (ACQUIRED): ICD-10-CM

## 2025-03-14 RX ORDER — LEVOTHYROXINE SODIUM 25 UG/1
25 TABLET ORAL EVERY MORNING
Qty: 30 TABLET | Refills: 0 | Status: SHIPPED | OUTPATIENT
Start: 2025-03-14

## 2025-04-11 ENCOUNTER — OFFICE VISIT (OUTPATIENT)
Dept: FAMILY MEDICINE CLINIC | Facility: CLINIC | Age: 45
End: 2025-04-11
Payer: COMMERCIAL

## 2025-04-11 VITALS
OXYGEN SATURATION: 99 % | HEIGHT: 64 IN | WEIGHT: 137.1 LBS | DIASTOLIC BLOOD PRESSURE: 78 MMHG | HEART RATE: 80 BPM | BODY MASS INDEX: 23.41 KG/M2 | SYSTOLIC BLOOD PRESSURE: 130 MMHG | TEMPERATURE: 97.6 F

## 2025-04-11 DIAGNOSIS — E03.9 HYPOTHYROIDISM (ACQUIRED): ICD-10-CM

## 2025-04-11 DIAGNOSIS — R30.9 PAINFUL URINATION: ICD-10-CM

## 2025-04-11 DIAGNOSIS — R09.81 CONGESTION OF NASAL SINUS: ICD-10-CM

## 2025-04-11 DIAGNOSIS — R35.0 FREQUENT URINATION: Primary | ICD-10-CM

## 2025-04-11 DIAGNOSIS — N30.01 ACUTE CYSTITIS WITH HEMATURIA: ICD-10-CM

## 2025-04-11 LAB
BILIRUB BLD-MCNC: NEGATIVE MG/DL
CLARITY, POC: ABNORMAL
COLOR UR: YELLOW
GLUCOSE UR STRIP-MCNC: NEGATIVE MG/DL
KETONES UR QL: NEGATIVE
LEUKOCYTE EST, POC: ABNORMAL
NITRITE UR-MCNC: NEGATIVE MG/ML
PH UR: 8.5 [PH] (ref 5–8)
PROT UR STRIP-MCNC: NEGATIVE MG/DL
RBC # UR STRIP: ABNORMAL /UL
SP GR UR: 1.01 (ref 1–1.03)
UROBILINOGEN UR QL: ABNORMAL

## 2025-04-11 PROCEDURE — 81002 URINALYSIS NONAUTO W/O SCOPE: CPT | Performed by: FAMILY MEDICINE

## 2025-04-11 PROCEDURE — 99213 OFFICE O/P EST LOW 20 MIN: CPT | Performed by: FAMILY MEDICINE

## 2025-04-11 RX ORDER — ESTRADIOL 0.05 MG/D
PATCH, EXTENDED RELEASE TRANSDERMAL
COMMUNITY
Start: 2025-02-21

## 2025-04-11 RX ORDER — PROGESTERONE 100 MG/1
CAPSULE ORAL
COMMUNITY
Start: 2025-02-08

## 2025-04-11 RX ORDER — AZELASTINE HYDROCHLORIDE, FLUTICASONE PROPIONATE 137; 50 UG/1; UG/1
1 SPRAY, METERED NASAL 2 TIMES DAILY
Qty: 23 G | Refills: 0 | Status: SHIPPED | OUTPATIENT
Start: 2025-04-11

## 2025-04-11 RX ORDER — LEVOTHYROXINE SODIUM 25 UG/1
25 TABLET ORAL EVERY MORNING
Qty: 30 TABLET | Refills: 0 | Status: SHIPPED | OUTPATIENT
Start: 2025-04-11

## 2025-04-11 RX ORDER — DOXEPIN HYDROCHLORIDE 50 MG/1
50 CAPSULE ORAL
COMMUNITY
Start: 2025-03-19

## 2025-04-11 RX ORDER — LUMATEPERONE 42 MG/1
1 CAPSULE ORAL
COMMUNITY
Start: 2025-04-01

## 2025-04-11 RX ORDER — NITROFURANTOIN 25; 75 MG/1; MG/1
100 CAPSULE ORAL 2 TIMES DAILY
Qty: 10 CAPSULE | Refills: 0 | Status: SHIPPED | OUTPATIENT
Start: 2025-04-11

## 2025-04-11 NOTE — PROGRESS NOTES
"  Subjective   Valeria Carrasquillo is a 45 y.o. female who is here for   Chief Complaint   Patient presents with    Frequent and painful urination    Sinus Problem   .     Urinary Tract Infection   This is a new problem. The current episode started in the past 7 days. The problem occurs constantly. The quality of the pain is described as burning. The patient is experiencing no pain. There has been no fever. Associated symptoms include frequency and urgency. Pertinent negatives include no chills. The treatment provided no relief. Her past medical history is significant for recurrent UTIs.   URI   This is a new problem. The current episode started in the past 7 days. The problem has been unchanged. Associated symptoms include congestion. Pertinent negatives include no chest pain, coughing or sinus pain. Associated symptoms comments: Nasal congestion. She has tried nothing for the symptoms. The treatment provided no relief.      History of Present Illness      Review of Systems   Constitutional:  Negative for chills and fever.   HENT:  Positive for congestion. Negative for sinus pain.    Respiratory:  Negative for cough and shortness of breath.    Cardiovascular:  Negative for chest pain and leg swelling.   Genitourinary:  Positive for frequency and urgency.       Objective   Vitals:    04/11/25 0903   BP: 130/78   BP Location: Left arm   Patient Position: Sitting   Cuff Size: Adult   Pulse: 80   Temp: 97.6 °F (36.4 °C)   TempSrc: Infrared   SpO2: 99%   Weight: 62.2 kg (137 lb 1.6 oz)   Height: 161.3 cm (63.5\")      Physical Exam  Vitals and nursing note reviewed.   Constitutional:       Appearance: Normal appearance. She is normal weight.   HENT:      Head: Normocephalic and atraumatic.      Nose: Congestion present.      Right Turbinates: Swollen.      Left Turbinates: Swollen.   Cardiovascular:      Rate and Rhythm: Normal rate and regular rhythm.      Pulses: Normal pulses.      Heart sounds: No murmur " heard.  Pulmonary:      Effort: Pulmonary effort is normal. No respiratory distress.      Breath sounds: Normal breath sounds. No wheezing.   Skin:     General: Skin is warm and dry.   Neurological:      General: No focal deficit present.      Mental Status: She is alert.   Psychiatric:         Mood and Affect: Mood normal.         Thought Content: Thought content normal.       Physical Exam        Assessment & Plan   Assessment & Plan    Diagnoses and all orders for this visit:    1. Frequent urination (Primary)  Brief Urine Lab Results  (Last result in the past 365 days)        Color   Clarity   Blood   Leuk Est   Nitrite   Protein   CREAT   Urine HCG        04/11/25 0918 Yellow   Cloudy   Large   Moderate (2+)   Negative   Negative                   -     POC Urinalysis Dipstick    2. Painful urination  Brief Urine Lab Results  (Last result in the past 365 days)        Color   Clarity   Blood   Leuk Est   Nitrite   Protein   CREAT   Urine HCG        04/11/25 0918 Yellow   Cloudy   Large   Moderate (2+)   Negative   Negative                   -     POC Urinalysis Dipstick  -     nitrofurantoin, macrocrystal-monohydrate, (Macrobid) 100 MG capsule; Take 1 capsule by mouth 2 (Two) Times a Day.  Dispense: 10 capsule; Refill: 0    3. Acute cystitis with hematuria  Will empirically treat with Macrobid 100 mg twice daily.  Will monitor for improvement of symptoms.  Urine culture pending.  Will adjust therapy if indicated.  -     Urine Culture - Urine, Urine, Clean Catch  -     nitrofurantoin, macrocrystal-monohydrate, (Macrobid) 100 MG capsule; Take 1 capsule by mouth 2 (Two) Times a Day.  Dispense: 10 capsule; Refill: 0    4. Congestion of nasal sinus  Likely secondary to recurrent use of Zicam nasal congestion nasal spray.  Will start Dymista 1 spray each nostril twice a day.  Will monitor for improvement of symptoms.  Patient may continue Allegra-D.  -     Azelastine-Fluticasone 137-50 MCG/ACT suspension; Administer 1  each into the nostril(s) as directed by provider 2 (Two) Times a Day.  Dispense: 23 g; Refill: 0      Results      There are no Patient Instructions on file for this visit.    There are no discontinued medications.     No follow-ups on file.  BMI is within normal parameters. No other follow-up for BMI required.    Fermin Boucher MD  [place], [place]

## 2025-04-15 LAB
BACTERIA UR CULT: ABNORMAL
BACTERIA UR CULT: ABNORMAL

## 2025-05-08 ENCOUNTER — OFFICE VISIT (OUTPATIENT)
Dept: FAMILY MEDICINE CLINIC | Facility: CLINIC | Age: 45
End: 2025-05-08
Payer: COMMERCIAL

## 2025-05-08 VITALS
OXYGEN SATURATION: 99 % | TEMPERATURE: 97.9 F | SYSTOLIC BLOOD PRESSURE: 120 MMHG | BODY MASS INDEX: 23.05 KG/M2 | HEART RATE: 81 BPM | HEIGHT: 64 IN | DIASTOLIC BLOOD PRESSURE: 70 MMHG | WEIGHT: 135 LBS

## 2025-05-08 DIAGNOSIS — F19.982 DRUG-INDUCED INSOMNIA: Primary | ICD-10-CM

## 2025-05-08 PROCEDURE — 99213 OFFICE O/P EST LOW 20 MIN: CPT | Performed by: NURSE PRACTITIONER

## 2025-05-08 RX ORDER — TRAZODONE HYDROCHLORIDE 50 MG/1
50 TABLET ORAL NIGHTLY PRN
Qty: 30 TABLET | Refills: 0 | Status: SHIPPED | OUTPATIENT
Start: 2025-05-08

## 2025-05-08 NOTE — PROGRESS NOTES
Answers submitted by the patient for this visit:  Problem not listed (Submitted on 5/8/2025)  Chief Complaint: Other medical problem  Reason for appointment: sleep  fatigue: Yes  nausea: Yes  Onset: 1 to 6 months  Medications tried: over the counter sleep aids..for stomach tums  Additional information: I can't sleep no matter how tired I am...makes me irritable throughout the day    Patient ID: Valeria Carrasquillo is a 45 y.o. female     Patient Care Team:  Head, ANISA Caldera as PCP - General (Nurse Practitioner)    Subjective     Chief Complaint   Patient presents with    Sleeping Problem     Unable to fall asleep and staying asleep. Is being managed by gyn for hormone replacement , symptoms started after treatment began.        History of Present Illness      History of Present Illness  The patient presents for evaluation of sleep issues.    She reports chronic sleep disturbances, often resorting to alcohol as a sleep aid. Despite practicing good sleep hygiene, including reducing screen time and using white noise, she experiences prolonged periods of wakefulness at night, sometimes lasting hours. Her sleep is fragmented, with brief episodes of approximately 30 minutes, after which she struggles to return to sleep. She suspects a hormonal imbalance may be contributing to her sleep issues. She has previously tried melatonin and Benadryl without success. She was prescribed Ambien in 2019 but discontinued it due to concerns about dependency.     She is currently under the care of an OB/GYN, Dr. Hall, who has been managing her hormone levels. She has been receiving testosterone pellets and recently added estrogen to her regimen. She plans to transition to a more affordable cream formulation. Her last lab work was conducted in 01/2025, and she is seeking a supplement to manage her symptoms until further lab results are available.    She was treated for a UTI by Dr. Boucher. She is seeing a psychiatrist and is on Vyvanse  now. She was taking doxepin for anxiety and sleep issues, but she does not remember why she was taking it. She tried Caplyta but did not want to continue it.        Results         She denies any complaints of fever, chills, cough, chest pain, shortness of air, abdominal pain, nausea, or any other concerns.     The following portions of the patient's history were reviewed and updated as appropriate: allergies, current medications, past family history, past medical history, past social history, past surgical history and problem list.       ROS    Vitals:    05/08/25 1338   BP: 120/70   Pulse: 81   Temp: 97.9 °F (36.6 °C)   SpO2: 99%       Documented weights    05/08/25 1338   Weight: 61.2 kg (135 lb)     Body mass index is 23.54 kg/m².    Results for orders placed or performed in visit on 04/11/25   POC Urinalysis Dipstick    Collection Time: 04/11/25  9:18 AM    Specimen: Urine   Result Value Ref Range    Color Yellow Yellow, Straw, Dark Yellow, Taylor    Clarity, UA Cloudy (A) Clear    Glucose, UA Negative Negative mg/dL    Bilirubin Negative Negative    Ketones, UA Negative Negative    Specific Gravity  1.010 1.005 - 1.030    Blood, UA Large (A) Negative    pH, Urine 8.5 (A) 5.0 - 8.0    Protein, POC Negative Negative mg/dL    Urobilinogen, UA 0.2 E.U./dL Normal, 0.2 E.U./dL    Leukocytes Moderate (2+) (A) Negative    Nitrite, UA Negative Negative           Objective     Physical Exam  Vitals reviewed.   Constitutional:       General: She is not in acute distress.  Cardiovascular:      Rate and Rhythm: Normal rate.   Pulmonary:      Effort: Pulmonary effort is normal.   Neurological:      Mental Status: She is alert and oriented to person, place, and time.   Psychiatric:         Mood and Affect: Mood normal.         Behavior: Behavior normal.         Thought Content: Thought content normal.         Judgment: Judgment normal.         Physical Exam      Assessment & Plan     Assessment/Plan     Assessment & Plan  1.  Insomnia.  - Insomnia appears to be drug-induced, potentially linked to hormone therapy.  - Prescription for trazodone 50 mg issued, to be taken nightly as needed.  - Advised to take the medication consistently for a week, approximately 30 minutes to an hour before bedtime; potential side effect of drowsiness discussed.  - Encouraged to communicate any issues; dosage increase will be considered if the initial dose is ineffective.    Follow-up scheduled for 10/2025.    Diagnoses and all orders for this visit:    1. Drug-induced insomnia (Primary)  -     traZODone (DESYREL) 50 MG tablet; Take 1 tablet by mouth At Night As Needed for Sleep.  Dispense: 30 tablet; Refill: 0          Follow Up:  No follow-ups on file.    In the meantime, instructed to contact us sooner for any problems or concerns.    Patient was given instructions and counseling regarding condition or for health maintenance advice.  Please see specific information pulled into the AVS if appropriate.      Patient or patient representative verbalized consent for the use of Ambient Listening during the visit with  ANISA Snider for chart documentation. 5/9/2025  14:45 EDT    ANISA Snider  Family Medicine  [facility]  05/09/25  14:45 EDT

## 2025-05-14 DIAGNOSIS — E03.9 HYPOTHYROIDISM (ACQUIRED): ICD-10-CM

## 2025-05-14 RX ORDER — LEVOTHYROXINE SODIUM 25 UG/1
25 TABLET ORAL EVERY MORNING
Qty: 30 TABLET | Refills: 0 | Status: SHIPPED | OUTPATIENT
Start: 2025-05-14

## 2025-06-08 DIAGNOSIS — E03.9 HYPOTHYROIDISM (ACQUIRED): ICD-10-CM

## 2025-06-09 RX ORDER — LEVOTHYROXINE SODIUM 25 UG/1
25 TABLET ORAL EVERY MORNING
Qty: 30 TABLET | Refills: 0 | Status: SHIPPED | OUTPATIENT
Start: 2025-06-09

## 2025-07-06 DIAGNOSIS — E03.9 HYPOTHYROIDISM (ACQUIRED): ICD-10-CM

## 2025-07-07 RX ORDER — LEVOTHYROXINE SODIUM 25 UG/1
25 TABLET ORAL EVERY MORNING
Qty: 30 TABLET | Refills: 0 | Status: SHIPPED | OUTPATIENT
Start: 2025-07-07

## 2025-08-09 DIAGNOSIS — E03.9 HYPOTHYROIDISM (ACQUIRED): ICD-10-CM

## 2025-08-11 RX ORDER — LEVOTHYROXINE SODIUM 25 UG/1
25 TABLET ORAL EVERY MORNING
Qty: 30 TABLET | Refills: 0 | Status: SHIPPED | OUTPATIENT
Start: 2025-08-11

## (undated) DEVICE — Device

## (undated) DEVICE — LINER SURG CANSTR SXN S/RIGD 1500CC

## (undated) DEVICE — SYR LL W/SCALE/MARK 3ML STRL

## (undated) DEVICE — KT ORCA ORCAPOD DISP STRL

## (undated) DEVICE — BW-412T DISP COMBO CLEANING BRUSH: Brand: SINGLE USE COMBINATION CLEANING BRUSH

## (undated) DEVICE — ADAPT CLN BIOGUARD AIR/H2O DISP

## (undated) DEVICE — SOL IRR H2O BTL 1000ML STRL